# Patient Record
Sex: FEMALE | Race: WHITE | Employment: FULL TIME | URBAN - METROPOLITAN AREA
[De-identification: names, ages, dates, MRNs, and addresses within clinical notes are randomized per-mention and may not be internally consistent; named-entity substitution may affect disease eponyms.]

---

## 2018-12-12 ENCOUNTER — OFFICE VISIT (OUTPATIENT)
Dept: INTERNAL MEDICINE CLINIC | Age: 53
End: 2018-12-12
Payer: COMMERCIAL

## 2018-12-12 VITALS
DIASTOLIC BLOOD PRESSURE: 72 MMHG | BODY MASS INDEX: 45.99 KG/M2 | WEIGHT: 293 LBS | SYSTOLIC BLOOD PRESSURE: 126 MMHG | HEIGHT: 67 IN | OXYGEN SATURATION: 96 % | HEART RATE: 69 BPM

## 2018-12-12 DIAGNOSIS — Z00.00 HEALTHCARE MAINTENANCE: ICD-10-CM

## 2018-12-12 DIAGNOSIS — J30.1 ALLERGIC RHINITIS DUE TO POLLEN, UNSPECIFIED SEASONALITY: Primary | ICD-10-CM

## 2018-12-12 DIAGNOSIS — F90.9 ATTENTION DEFICIT HYPERACTIVITY DISORDER (ADHD), UNSPECIFIED ADHD TYPE: ICD-10-CM

## 2018-12-12 DIAGNOSIS — F41.1 GAD (GENERALIZED ANXIETY DISORDER): ICD-10-CM

## 2018-12-12 DIAGNOSIS — I10 ESSENTIAL HYPERTENSION: ICD-10-CM

## 2018-12-12 PROCEDURE — 99203 OFFICE O/P NEW LOW 30 MIN: CPT | Performed by: NURSE PRACTITIONER

## 2018-12-12 RX ORDER — LISINOPRIL AND HYDROCHLOROTHIAZIDE 12.5; 1 MG/1; MG/1
1 TABLET ORAL DAILY
COMMUNITY
End: 2018-12-14 | Stop reason: SDUPTHER

## 2018-12-12 RX ORDER — FEXOFENADINE HCL 180 MG/1
TABLET ORAL
Qty: 30 TABLET | Refills: 3 | Status: SHIPPED | OUTPATIENT
Start: 2018-12-12 | End: 2019-03-14 | Stop reason: SDUPTHER

## 2018-12-12 ASSESSMENT — PATIENT HEALTH QUESTIONNAIRE - PHQ9
2. FEELING DOWN, DEPRESSED OR HOPELESS: 0
SUM OF ALL RESPONSES TO PHQ9 QUESTIONS 1 & 2: 0
SUM OF ALL RESPONSES TO PHQ QUESTIONS 1-9: 0
SUM OF ALL RESPONSES TO PHQ QUESTIONS 1-9: 0
1. LITTLE INTEREST OR PLEASURE IN DOING THINGS: 0

## 2018-12-12 ASSESSMENT — ENCOUNTER SYMPTOMS
CHEST TIGHTNESS: 0
SHORTNESS OF BREATH: 0
DIARRHEA: 0
CONSTIPATION: 0

## 2018-12-12 NOTE — PROGRESS NOTES
2018    Alida Major (:  1965) magnus 47 y.o. female, here for evaluation of the following medical concerns: This patient is being seen today for hypertension, ADHD, and depression. She would like to establish care and receive preventative care services. Hypertension: Patient here for follow-up of elevated blood pressure. She is not exercising and is not adherent to low salt diet. Blood pressure is well controlled at home. Cardiac symptoms none. Patient denies chest pain, dyspnea, fatigue and irregular heart beat. Cardiovascular risk factors: obesity (BMI >= 30 kg/m2) and sedentary lifestyle. Use of agents associated with hypertension: none. History of target organ damage: none. Depression  Patient complains of depression. She complains of depressed mood. Onset was approximately several years ago. Symptoms have been stable since that time. Current symptoms include: depressed mood. Patient denies fatigue, feelings of worthlessness/guilt, hopelessness, hypersomnia, insomnia, suicidal attempt and suicidal thoughts with specific plan. Family history significant for no psychiatric illness. Possible organic causes contributing are: none. Risk factors: none. Currently taking Celexa with significant results. ADHD: Patient complains of ADHD  She has the following anxiety symptoms: difficulty concentrating. Onset of symptoms was  several years ago, gradually worsening since that time. She denies current suicidal and homicidal ideation. Possible organic causes contributing are: none. Risk factors: none Previous treatment includes Adderall. She has not taken medication for several months. Review of Systems   Constitutional: Negative for activity change. HENT: Positive for congestion. Negative for postnasal drip. Eyes: Negative for visual disturbance. Respiratory: Negative for chest tightness and shortness of breath. Cardiovascular: Negative for chest pain and palpitations. file.     Social History Main Topics    Smoking status: Former Smoker     Types: Cigarettes     Start date: 1/1/1982     Quit date: 1/1/1998    Smokeless tobacco: Never Used    Alcohol use Yes      Comment: occ wine    Drug use: No    Sexual activity: Not on file     Other Topics Concern    Not on file     Social History Narrative    Lives alone        Family History   Problem Relation Age of Onset    Hypertension Mother     Cancer Mother         breast    Dementia Mother     Stroke Mother     Hypertension Father     Diabetes Father     Dementia Father     No Known Problems Maternal Grandmother     No Known Problems Maternal Grandfather     No Known Problems Paternal Grandmother     No Known Problems Paternal Grandfather        Vitals:    12/12/18 1251   BP: 126/72   Pulse: 69   SpO2: 96%   Weight: (!) 319 lb 9.6 oz (145 kg)   Height: 5' 7\" (1.702 m)     Estimated body mass index is 50.06 kg/m² as calculated from the following:    Height as of this encounter: 5' 7\" (1.702 m). Weight as of this encounter: 319 lb 9.6 oz (145 kg). Physical Exam   Constitutional: She is oriented to person, place, and time. Vital signs are normal. She appears well-developed and well-nourished. She is cooperative. HENT:   Head: Normocephalic. Right Ear: Hearing and external ear normal.   Left Ear: Hearing and external ear normal.   Nose: Nose normal.   Eyes: Lids are normal.   Cardiovascular: Normal rate, regular rhythm, S1 normal, S2 normal, normal heart sounds and intact distal pulses. Pulmonary/Chest: Effort normal and breath sounds normal.   Neurological: She is alert and oriented to person, place, and time. GCS eye subscore is 4. GCS verbal subscore is 5. GCS motor subscore is 6. Skin: Skin is warm and dry. Vitals reviewed. ASSESSMENT/PLAN:  1.  Allergic rhinitis due to pollen, unspecified seasonality  -reports intermittent symptoms, requesting a refill  - fexofenadine (ALLEGRA) 180 MG tablet;

## 2018-12-13 ASSESSMENT — ENCOUNTER SYMPTOMS: BACK PAIN: 0

## 2018-12-14 DIAGNOSIS — I10 ESSENTIAL HYPERTENSION: ICD-10-CM

## 2018-12-14 RX ORDER — ATENOLOL 50 MG/1
50 TABLET ORAL DAILY
Qty: 30 TABLET | Refills: 3 | Status: SHIPPED | OUTPATIENT
Start: 2018-12-14 | End: 2019-03-14

## 2018-12-14 RX ORDER — LISINOPRIL AND HYDROCHLOROTHIAZIDE 12.5; 1 MG/1; MG/1
1 TABLET ORAL DAILY
Qty: 30 TABLET | Refills: 3 | Status: SHIPPED | OUTPATIENT
Start: 2018-12-14 | End: 2019-03-14 | Stop reason: SDUPTHER

## 2018-12-14 RX ORDER — CITALOPRAM 40 MG/1
TABLET ORAL
Qty: 30 TABLET | Refills: 3 | Status: SHIPPED | OUTPATIENT
Start: 2018-12-14 | End: 2019-03-14 | Stop reason: SDUPTHER

## 2018-12-14 RX ORDER — LEVOTHYROXINE SODIUM 0.03 MG/1
TABLET ORAL
Qty: 30 TABLET | Refills: 3 | Status: SHIPPED | OUTPATIENT
Start: 2018-12-14 | End: 2019-03-14 | Stop reason: SDUPTHER

## 2018-12-20 ENCOUNTER — TELEPHONE (OUTPATIENT)
Dept: PAIN MANAGEMENT | Age: 53
End: 2018-12-20

## 2018-12-20 NOTE — TELEPHONE ENCOUNTER
Submitted PA for Vyvanse 30MG OR CAPS Key: [id number] - PA Case ID: [id number] - Rx #: [id number] , via CM. Medication is APPROVED until 12/20/2019. Please advise patient. Thank you.

## 2019-03-14 ENCOUNTER — OFFICE VISIT (OUTPATIENT)
Dept: INTERNAL MEDICINE CLINIC | Age: 54
End: 2019-03-14
Payer: COMMERCIAL

## 2019-03-14 ENCOUNTER — HOSPITAL ENCOUNTER (OUTPATIENT)
Dept: MAMMOGRAPHY | Age: 54
Discharge: HOME | End: 2019-03-19
Payer: COMMERCIAL

## 2019-03-14 VITALS
HEIGHT: 67 IN | DIASTOLIC BLOOD PRESSURE: 81 MMHG | SYSTOLIC BLOOD PRESSURE: 127 MMHG | HEART RATE: 69 BPM | OXYGEN SATURATION: 96 % | WEIGHT: 293 LBS | BODY MASS INDEX: 45.99 KG/M2

## 2019-03-14 DIAGNOSIS — J30.1 ALLERGIC RHINITIS DUE TO POLLEN, UNSPECIFIED SEASONALITY: ICD-10-CM

## 2019-03-14 DIAGNOSIS — I10 ESSENTIAL HYPERTENSION: Primary | ICD-10-CM

## 2019-03-14 DIAGNOSIS — Z12.31 VISIT FOR SCREENING MAMMOGRAM: ICD-10-CM

## 2019-03-14 DIAGNOSIS — F41.1 GAD (GENERALIZED ANXIETY DISORDER): ICD-10-CM

## 2019-03-14 DIAGNOSIS — F90.9 ATTENTION DEFICIT HYPERACTIVITY DISORDER (ADHD), UNSPECIFIED ADHD TYPE: ICD-10-CM

## 2019-03-14 DIAGNOSIS — Z00.00 HEALTHCARE MAINTENANCE: ICD-10-CM

## 2019-03-14 PROCEDURE — 77063 BREAST TOMOSYNTHESIS BI: CPT

## 2019-03-14 PROCEDURE — 99214 OFFICE O/P EST MOD 30 MIN: CPT | Performed by: NURSE PRACTITIONER

## 2019-03-14 RX ORDER — LISINOPRIL AND HYDROCHLOROTHIAZIDE 12.5; 1 MG/1; MG/1
1 TABLET ORAL DAILY
Qty: 30 TABLET | Refills: 5 | Status: SHIPPED | OUTPATIENT
Start: 2019-03-14 | End: 2019-04-15 | Stop reason: SDUPTHER

## 2019-03-14 RX ORDER — CITALOPRAM 40 MG/1
TABLET ORAL
Qty: 30 TABLET | Refills: 5 | Status: SHIPPED | OUTPATIENT
Start: 2019-03-14 | End: 2019-04-15 | Stop reason: SDUPTHER

## 2019-03-14 RX ORDER — LEVOTHYROXINE SODIUM 0.03 MG/1
TABLET ORAL
Qty: 30 TABLET | Refills: 5 | Status: SHIPPED | OUTPATIENT
Start: 2019-03-14 | End: 2019-04-15 | Stop reason: SDUPTHER

## 2019-03-14 RX ORDER — FEXOFENADINE HCL 180 MG/1
TABLET ORAL
Qty: 30 TABLET | Refills: 5 | Status: SHIPPED | OUTPATIENT
Start: 2019-03-14 | End: 2021-03-29

## 2019-03-14 RX ORDER — ATENOLOL 50 MG/1
50 TABLET ORAL DAILY
Qty: 30 TABLET | Refills: 5 | Status: SHIPPED | OUTPATIENT
Start: 2019-03-14 | End: 2019-04-15 | Stop reason: SDUPTHER

## 2019-03-14 RX ORDER — ATENOLOL 50 MG/1
50 TABLET ORAL DAILY
COMMUNITY
End: 2019-03-14 | Stop reason: SDUPTHER

## 2019-03-14 ASSESSMENT — ENCOUNTER SYMPTOMS
SHORTNESS OF BREATH: 0
DIARRHEA: 0
CONSTIPATION: 0
CHEST TIGHTNESS: 0
ABDOMINAL PAIN: 0

## 2019-03-14 ASSESSMENT — PATIENT HEALTH QUESTIONNAIRE - PHQ9
SUM OF ALL RESPONSES TO PHQ QUESTIONS 1-9: 0
SUM OF ALL RESPONSES TO PHQ QUESTIONS 1-9: 0
SUM OF ALL RESPONSES TO PHQ9 QUESTIONS 1 & 2: 0
2. FEELING DOWN, DEPRESSED OR HOPELESS: 0
1. LITTLE INTEREST OR PLEASURE IN DOING THINGS: 0

## 2019-03-18 NOTE — PROGRESS NOTES
Patient not reached. Preop instructions left on voice mail. Ltzneq__[phone]_______      DATE_4/3/19_______ TIME__0800_____ARRIVAL__FEC  0630_______      Nothing to eat or drink after midnight the night before,except for what the prep instructions call for. If you do not have the instructions or do not understand them please contact your doctors office. Follow any instructions your doctors office has given you including what medications to take the AM of your procedure and which ones to hold. You may use your inhalers. If you take a long acting insulin the juvencio prior please cut the dose in half and take no diabetic medications that AM.Follow specific doctors office instructions regarding blood thinners and if they want you to hold and for how long. If you are on a blood thinner and have no instructions please contact the office and ask. Dress comfortably,bring your insurance card,picture ID,and a complete list of medications, including supplements. You must have a responsible adult to stay with you during the procedure,drive you home and stay with you. [facility] phone number [phone] for any questions.

## 2019-03-19 ENCOUNTER — ANESTHESIA EVENT (OUTPATIENT)
Dept: ENDOSCOPY | Age: 54
End: 2019-03-19
Payer: COMMERCIAL

## 2019-04-03 ENCOUNTER — ANESTHESIA (OUTPATIENT)
Dept: ENDOSCOPY | Age: 54
End: 2019-04-03
Payer: COMMERCIAL

## 2019-04-14 DIAGNOSIS — I10 ESSENTIAL HYPERTENSION: ICD-10-CM

## 2019-04-15 RX ORDER — CITALOPRAM 40 MG/1
TABLET ORAL
Qty: 30 TABLET | Refills: 0 | Status: SHIPPED | OUTPATIENT
Start: 2019-04-15 | End: 2019-10-08 | Stop reason: SDUPTHER

## 2019-04-15 RX ORDER — ATENOLOL 50 MG/1
50 TABLET ORAL DAILY
Qty: 30 TABLET | Refills: 0 | Status: SHIPPED | OUTPATIENT
Start: 2019-04-15 | End: 2019-10-08 | Stop reason: SDUPTHER

## 2019-04-15 RX ORDER — LEVOTHYROXINE SODIUM 0.03 MG/1
TABLET ORAL
Qty: 30 TABLET | Refills: 0 | Status: SHIPPED | OUTPATIENT
Start: 2019-04-15 | End: 2019-10-08 | Stop reason: SDUPTHER

## 2019-04-15 RX ORDER — LISINOPRIL AND HYDROCHLOROTHIAZIDE 12.5; 1 MG/1; MG/1
1 TABLET ORAL DAILY
Qty: 30 TABLET | Refills: 0 | Status: SHIPPED | OUTPATIENT
Start: 2019-04-15 | End: 2019-10-08 | Stop reason: SDUPTHER

## 2019-04-16 ENCOUNTER — HOSPITAL ENCOUNTER (OUTPATIENT)
Age: 54
Setting detail: OUTPATIENT SURGERY
Discharge: HOME | End: 2019-04-16
Attending: INTERNAL MEDICINE | Admitting: INTERNAL MEDICINE
Payer: COMMERCIAL

## 2019-04-16 VITALS
WEIGHT: 280 LBS | HEIGHT: 68 IN | DIASTOLIC BLOOD PRESSURE: 71 MMHG | SYSTOLIC BLOOD PRESSURE: 129 MMHG | RESPIRATION RATE: 16 BRPM | OXYGEN SATURATION: 100 % | BODY MASS INDEX: 42.44 KG/M2 | TEMPERATURE: 98.1 F | HEART RATE: 71 BPM

## 2019-04-16 VITALS
OXYGEN SATURATION: 93 % | RESPIRATION RATE: 19 BRPM | DIASTOLIC BLOOD PRESSURE: 79 MMHG | SYSTOLIC BLOOD PRESSURE: 116 MMHG

## 2019-04-16 PROCEDURE — 3700000000 HC ANESTHESIA ATTENDED CARE: Performed by: INTERNAL MEDICINE

## 2019-04-16 PROCEDURE — 2500000003 HC RX 250 WO HCPCS: Performed by: NURSE ANESTHETIST, CERTIFIED REGISTERED

## 2019-04-16 PROCEDURE — 7100000011 HC PHASE II RECOVERY - ADDTL 15 MIN: Performed by: INTERNAL MEDICINE

## 2019-04-16 PROCEDURE — 6360000002 HC RX W HCPCS: Performed by: NURSE ANESTHETIST, CERTIFIED REGISTERED

## 2019-04-16 PROCEDURE — 3609010600 HC COLONOSCOPY POLYPECTOMY SNARE/COLD BIOPSY: Performed by: INTERNAL MEDICINE

## 2019-04-16 PROCEDURE — 7100000010 HC PHASE II RECOVERY - FIRST 15 MIN: Performed by: INTERNAL MEDICINE

## 2019-04-16 PROCEDURE — 3700000001 HC ADD 15 MINUTES (ANESTHESIA): Performed by: INTERNAL MEDICINE

## 2019-04-16 PROCEDURE — 2709999900 HC NON-CHARGEABLE SUPPLY: Performed by: INTERNAL MEDICINE

## 2019-04-16 PROCEDURE — 2580000003 HC RX 258: Performed by: ANESTHESIOLOGY

## 2019-04-16 RX ORDER — SODIUM CHLORIDE 9 MG/ML
INJECTION, SOLUTION INTRAVENOUS CONTINUOUS
Status: DISCONTINUED | OUTPATIENT
Start: 2019-04-16 | End: 2019-04-16 | Stop reason: HOSPADM

## 2019-04-16 RX ORDER — LIDOCAINE HYDROCHLORIDE 20 MG/ML
INJECTION, SOLUTION INFILTRATION; PERINEURAL PRN
Status: DISCONTINUED | OUTPATIENT
Start: 2019-04-16 | End: 2019-04-16 | Stop reason: SDUPTHER

## 2019-04-16 RX ORDER — PROPOFOL 10 MG/ML
INJECTION, EMULSION INTRAVENOUS PRN
Status: DISCONTINUED | OUTPATIENT
Start: 2019-04-16 | End: 2019-04-16 | Stop reason: SDUPTHER

## 2019-04-16 RX ADMIN — PROPOFOL 40 MG: 10 INJECTION, EMULSION INTRAVENOUS at 08:00

## 2019-04-16 RX ADMIN — PROPOFOL 100 MG: 10 INJECTION, EMULSION INTRAVENOUS at 08:06

## 2019-04-16 RX ADMIN — PROPOFOL 40 MG: 10 INJECTION, EMULSION INTRAVENOUS at 08:03

## 2019-04-16 RX ADMIN — PROPOFOL 30 MG: 10 INJECTION, EMULSION INTRAVENOUS at 07:56

## 2019-04-16 RX ADMIN — PROPOFOL 200 MG: 10 INJECTION, EMULSION INTRAVENOUS at 07:50

## 2019-04-16 RX ADMIN — PROPOFOL 30 MG: 10 INJECTION, EMULSION INTRAVENOUS at 07:52

## 2019-04-16 RX ADMIN — LIDOCAINE HYDROCHLORIDE 100 MG: 20 INJECTION, SOLUTION INFILTRATION; PERINEURAL at 07:50

## 2019-04-16 RX ADMIN — PROPOFOL 10 MG: 10 INJECTION, EMULSION INTRAVENOUS at 08:05

## 2019-04-16 RX ADMIN — SODIUM CHLORIDE: 9 INJECTION, SOLUTION INTRAVENOUS at 07:15

## 2019-04-16 RX ADMIN — PROPOFOL 30 MG: 10 INJECTION, EMULSION INTRAVENOUS at 07:58

## 2019-04-16 RX ADMIN — PROPOFOL 20 MG: 10 INJECTION, EMULSION INTRAVENOUS at 07:54

## 2019-04-16 ASSESSMENT — PAIN - FUNCTIONAL ASSESSMENT: PAIN_FUNCTIONAL_ASSESSMENT: 0-10

## 2019-04-16 ASSESSMENT — PAIN SCALES - GENERAL: PAINLEVEL_OUTOF10: 0

## 2019-04-16 NOTE — PROGRESS NOTES
Name:  Konrad Mcintosh  Age:  47 y.o.  CSN:  [id number]            Past Medical History:        Diagnosis Date    ADHD (attention deficit hyperactivity disorder)     FINESSE (generalized anxiety disorder)     Hypertension     Hypothyroidism     Obesity        Past Surgical History:      Procedure Laterality Date    CARPAL TUNNEL RELEASE Right     25 years ago    CHOLECYSTECTOMY      LUMBAR LAMINECTOMY  2015    L4-L5 with cyst removal    SINUS SURGERY         Medications Prior to Admission:  Medications Prior to Admission: atenolol (TENORMIN) 50 MG tablet, TAKE 1 TABLET BY MOUTH DAILY  lisinopril-hydrochlorothiazide (PRINZIDE;ZESTORETIC) 10-12.5 MG per tablet, TAKE 1 TABLET BY MOUTH DAILY  levothyroxine (SYNTHROID) 25 MCG tablet, TAKE 1 TABLET BY MOUTH EVERY DAY  citalopram (CELEXA) 40 MG tablet, TAKE 1 TABLET BY MOUTH DAILY  lisdexamfetamine (VYVANSE) 30 MG capsule, Take 1 capsule by mouth every morning for 30 days. [place] [place] lisdexamfetamine (VYVANSE) 30 MG capsule, Take 30 mg by mouth every morning. lisdexamfetamine (VYVANSE) 30 MG capsule, Take 1 capsule by mouth every morning for 30 days. [START ON 5/13/2019] lisdexamfetamine (VYVANSE) 30 MG capsule, Take 1 capsule by mouth every morning for 30 days.   fexofenadine (ALLEGRA) 180 MG tablet, TAKE 1 TABLET BY MOUTH EVERY DAY    Allergies:  Latex    Social History:  Social History     Socioeconomic History    Marital status: Single     Spouse name: Not on file    Number of children: Not on file    Years of education: Not on file    Highest education level: Not on file   Occupational History    Not on file   Social Needs    Financial resource strain: Not on file    Food insecurity:     Worry: Not on file     Inability: Not on file    Transportation needs:     Medical: Not on file     Non-medical: Not on file   Tobacco Use    Smoking status: Former Smoker     Types: Cigarettes     Start date: 1/1/1982     Last attempt to quit: 1/1/1998     Years since quitting: 21.3    Smokeless tobacco: Never Used   Substance and Sexual Activity    Alcohol use: Yes     Comment: occ wine    Drug use: No    Sexual activity: Not on file   Lifestyle    Physical activity:     Days per week: Not on file     Minutes per session: Not on file    Stress: Not on file   Relationships    Social connections:     Talks on phone: Not on file     Gets together: Not on file     Attends Mu-ism service: Not on file     Active member of club or organization: Not on file     Attends meetings of clubs or organizations: Not on file     Relationship status: Not on file    Intimate partner violence:     Fear of current or ex partner: Not on file     Emotionally abused: Not on file     Physically abused: Not on file     Forced sexual activity: Not on file   Other Topics Concern    Not on file   Social History Narrative    Lives alone       Family History:      Problem Relation Age of Onset    Hypertension Mother     Cancer Mother         breast    Dementia Mother     Stroke Mother     Hypertension Father     Diabetes Father     Dementia Father     No Known Problems Maternal Grandmother     No Known Problems Maternal Grandfather     No Known Problems Paternal Grandmother     No Known Problems Paternal Grandfather        Vital Signs:  Vitals:    04/16/19 0656   BP: (!) 155/79   Pulse: 80   Resp: 16   Temp: 97.8 °F (36.6 °C)   SpO2: 98%

## 2019-04-16 NOTE — ANESTHESIA PRE PROCEDURE
ADHD (attention deficit hyperactivity disorder)     FINESSE (generalized anxiety disorder)     Hypertension     Hypothyroidism     Obesity        Past Surgical History:        Procedure Laterality Date    CARPAL TUNNEL RELEASE Right     25 years ago    CHOLECYSTECTOMY      LUMBAR LAMINECTOMY  2015    L4-L5 with cyst removal    SINUS SURGERY         Social History:    Social History     Tobacco Use    Smoking status: Former Smoker     Types: Cigarettes     Start date: 1982     Last attempt to quit: 1998     Years since quittin.3    Smokeless tobacco: Never Used   Substance Use Topics    Alcohol use: Yes     Comment: occ wine                                Counseling given: Not Answered      Vital Signs (Current): There were no vitals filed for this visit. BP Readings from Last 3 Encounters:   19 127/81   18 126/72   07/15/13 118/71       NPO Status:                                                                                 BMI:   Wt Readings from Last 3 Encounters:   19 (!) 319 lb 12.8 oz (145.1 kg)   18 (!) 319 lb 9.6 oz (145 kg)     There is no height or weight on file to calculate BMI.    CBC: No results found for: WBC, RBC, HGB, HCT, MCV, RDW, PLT    CMP: No results found for: NA, K, CL, CO2, BUN, CREATININE, GFRAA, AGRATIO, LABGLOM, GLUCOSE, PROT, CALCIUM, BILITOT, ALKPHOS, AST, ALT    POC Tests: No results for input(s): POCGLU, POCNA, POCK, POCCL, POCBUN, POCHEMO, POCHCT in the last 72 hours.     Coags: No results found for: PROTIME, INR, APTT    HCG (If Applicable): No results found for: PREGTESTUR, PREGSERUM, HCG, HCGQUANT     ABGs: No results found for: PHART, PO2ART, VYU8TMC, WKA2QFR, BEART, T9MFDPPF     Type & Screen (If Applicable):  No results found for: LABABO, LABRH    Anesthesia Evaluation    Airway: Mallampati: II  TM distance: >3 FB   Neck ROM: full  Mouth opening: > = 3 FB Dental:          Pulmonary: Cardiovascular:    (+) hypertension:,         Rhythm: regular  Rate: normal                    Neuro/Psych:   (+) psychiatric history:            GI/Hepatic/Renal:             Endo/Other:    (+) hypothyroidism::., .                 Abdominal:           Vascular:                                        Anesthesia Plan      MAC     ASA 2       Induction: intravenous. Anesthetic plan and risks discussed with patient. Plan discussed with CRNA.                   Blaze Martinez MD   4/16/2019

## 2019-10-08 DIAGNOSIS — F90.9 ATTENTION DEFICIT HYPERACTIVITY DISORDER (ADHD), UNSPECIFIED ADHD TYPE: Primary | ICD-10-CM

## 2019-10-08 DIAGNOSIS — I10 ESSENTIAL HYPERTENSION: ICD-10-CM

## 2019-10-08 RX ORDER — LEVOTHYROXINE SODIUM 0.03 MG/1
TABLET ORAL
Qty: 30 TABLET | Refills: 1 | Status: SHIPPED | OUTPATIENT
Start: 2019-10-08 | End: 2019-10-09 | Stop reason: SDUPTHER

## 2019-10-08 RX ORDER — LISINOPRIL AND HYDROCHLOROTHIAZIDE 12.5; 1 MG/1; MG/1
1 TABLET ORAL DAILY
Qty: 30 TABLET | Refills: 1 | Status: SHIPPED | OUTPATIENT
Start: 2019-10-08 | End: 2019-10-09 | Stop reason: SDUPTHER

## 2019-10-08 RX ORDER — ATENOLOL 50 MG/1
50 TABLET ORAL DAILY
Qty: 30 TABLET | Refills: 1 | Status: SHIPPED | OUTPATIENT
Start: 2019-10-08 | End: 2019-12-07 | Stop reason: SDUPTHER

## 2019-10-08 RX ORDER — CITALOPRAM 40 MG/1
TABLET ORAL
Qty: 30 TABLET | Refills: 1 | Status: SHIPPED | OUTPATIENT
Start: 2019-10-08 | End: 2019-10-09 | Stop reason: SDUPTHER

## 2019-10-09 DIAGNOSIS — I10 ESSENTIAL HYPERTENSION: ICD-10-CM

## 2019-10-09 DIAGNOSIS — F90.9 ATTENTION DEFICIT HYPERACTIVITY DISORDER (ADHD), UNSPECIFIED ADHD TYPE: ICD-10-CM

## 2019-10-09 RX ORDER — LEVOTHYROXINE SODIUM 0.03 MG/1
TABLET ORAL
Qty: 30 TABLET | Refills: 3 | Status: SHIPPED | OUTPATIENT
Start: 2019-10-09 | End: 2020-02-04

## 2019-10-09 RX ORDER — CITALOPRAM 40 MG/1
TABLET ORAL
Qty: 30 TABLET | Refills: 3 | Status: SHIPPED | OUTPATIENT
Start: 2019-10-09 | End: 2020-02-04

## 2019-10-09 RX ORDER — LISINOPRIL AND HYDROCHLOROTHIAZIDE 12.5; 1 MG/1; MG/1
1 TABLET ORAL DAILY
Qty: 30 TABLET | Refills: 3 | Status: SHIPPED | OUTPATIENT
Start: 2019-10-09 | End: 2020-02-04

## 2019-10-22 ENCOUNTER — OFFICE VISIT (OUTPATIENT)
Dept: INTERNAL MEDICINE CLINIC | Age: 54
End: 2019-10-22
Payer: COMMERCIAL

## 2019-10-22 VITALS
HEART RATE: 67 BPM | SYSTOLIC BLOOD PRESSURE: 132 MMHG | DIASTOLIC BLOOD PRESSURE: 78 MMHG | BODY MASS INDEX: 47.09 KG/M2 | WEIGHT: 293 LBS | OXYGEN SATURATION: 94 % | HEIGHT: 66 IN

## 2019-10-22 DIAGNOSIS — F90.9 ATTENTION DEFICIT HYPERACTIVITY DISORDER (ADHD), UNSPECIFIED ADHD TYPE: Primary | ICD-10-CM

## 2019-10-22 DIAGNOSIS — E66.01 CLASS 3 SEVERE OBESITY WITHOUT SERIOUS COMORBIDITY WITH BODY MASS INDEX (BMI) OF 50.0 TO 59.9 IN ADULT, UNSPECIFIED OBESITY TYPE (HCC): ICD-10-CM

## 2019-10-22 DIAGNOSIS — R06.2 WHEEZING: ICD-10-CM

## 2019-10-22 DIAGNOSIS — I10 ESSENTIAL HYPERTENSION: ICD-10-CM

## 2019-10-22 PROCEDURE — 99214 OFFICE O/P EST MOD 30 MIN: CPT | Performed by: NURSE PRACTITIONER

## 2019-10-22 RX ORDER — ALBUTEROL SULFATE 90 UG/1
2 AEROSOL, METERED RESPIRATORY (INHALATION) EVERY 6 HOURS PRN
Qty: 1 INHALER | Refills: 3 | Status: SHIPPED | OUTPATIENT
Start: 2019-10-22 | End: 2021-03-01 | Stop reason: SDUPTHER

## 2019-10-24 ASSESSMENT — ENCOUNTER SYMPTOMS
CONSTIPATION: 0
SHORTNESS OF BREATH: 0
DIARRHEA: 0
CHEST TIGHTNESS: 0
ABDOMINAL PAIN: 0

## 2020-06-04 RX ORDER — LEVOTHYROXINE SODIUM 0.03 MG/1
TABLET ORAL
Qty: 90 TABLET | Refills: 1 | Status: SHIPPED | OUTPATIENT
Start: 2020-06-04 | End: 2020-12-09 | Stop reason: SDUPTHER

## 2020-06-04 RX ORDER — LISINOPRIL AND HYDROCHLOROTHIAZIDE 12.5; 1 MG/1; MG/1
1 TABLET ORAL DAILY
Qty: 90 TABLET | Refills: 1 | Status: SHIPPED | OUTPATIENT
Start: 2020-06-04 | End: 2020-12-09 | Stop reason: SDUPTHER

## 2020-06-04 RX ORDER — CITALOPRAM 40 MG/1
TABLET ORAL
Qty: 90 TABLET | Refills: 1 | Status: SHIPPED | OUTPATIENT
Start: 2020-06-04 | End: 2020-12-08 | Stop reason: SDUPTHER

## 2020-10-27 RX ORDER — ATENOLOL 50 MG/1
50 TABLET ORAL DAILY
Qty: 90 TABLET | Refills: 3 | Status: SHIPPED | OUTPATIENT
Start: 2020-10-27 | End: 2020-12-09 | Stop reason: SDUPTHER

## 2020-12-08 RX ORDER — CITALOPRAM 40 MG/1
TABLET ORAL
Qty: 90 TABLET | Refills: 1 | Status: SHIPPED | OUTPATIENT
Start: 2020-12-08 | End: 2021-06-02

## 2020-12-09 ENCOUNTER — VIRTUAL VISIT (OUTPATIENT)
Dept: PRIMARY CARE CLINIC | Age: 55
End: 2020-12-09
Payer: COMMERCIAL

## 2020-12-09 PROCEDURE — 99214 OFFICE O/P EST MOD 30 MIN: CPT | Performed by: NURSE PRACTITIONER

## 2020-12-09 RX ORDER — DEXTROAMPHETAMINE SACCHARATE, AMPHETAMINE ASPARTATE, DEXTROAMPHETAMINE SULFATE AND AMPHETAMINE SULFATE 5; 5; 5; 5 MG/1; MG/1; MG/1; MG/1
20 TABLET ORAL DAILY
Qty: 30 TABLET | Refills: 0 | Status: SHIPPED | OUTPATIENT
Start: 2020-12-09 | End: 2021-03-29 | Stop reason: SDUPTHER

## 2020-12-09 RX ORDER — DEXTROAMPHETAMINE SACCHARATE, AMPHETAMINE ASPARTATE, DEXTROAMPHETAMINE SULFATE AND AMPHETAMINE SULFATE 5; 5; 5; 5 MG/1; MG/1; MG/1; MG/1
20 TABLET ORAL DAILY
Qty: 30 TABLET | Refills: 0 | Status: SHIPPED | OUTPATIENT
Start: 2021-02-09 | End: 2021-03-29 | Stop reason: SDUPTHER

## 2020-12-09 RX ORDER — LEVOTHYROXINE SODIUM 0.03 MG/1
TABLET ORAL
Qty: 90 TABLET | Refills: 1 | Status: SHIPPED | OUTPATIENT
Start: 2020-12-09 | End: 2021-06-02

## 2020-12-09 RX ORDER — LISINOPRIL AND HYDROCHLOROTHIAZIDE 12.5; 1 MG/1; MG/1
1 TABLET ORAL DAILY
Qty: 90 TABLET | Refills: 1 | Status: SHIPPED | OUTPATIENT
Start: 2020-12-09 | End: 2021-06-02

## 2020-12-09 RX ORDER — DEXTROAMPHETAMINE SACCHARATE, AMPHETAMINE ASPARTATE, DEXTROAMPHETAMINE SULFATE AND AMPHETAMINE SULFATE 5; 5; 5; 5 MG/1; MG/1; MG/1; MG/1
20 TABLET ORAL DAILY
Qty: 30 TABLET | Refills: 0 | Status: SHIPPED | OUTPATIENT
Start: 2021-01-09 | End: 2021-03-29 | Stop reason: SDUPTHER

## 2020-12-09 RX ORDER — ATENOLOL 50 MG/1
50 TABLET ORAL DAILY
Qty: 90 TABLET | Refills: 1 | Status: SHIPPED | OUTPATIENT
Start: 2020-12-09 | End: 2021-09-02

## 2020-12-09 ASSESSMENT — ENCOUNTER SYMPTOMS
DIARRHEA: 0
ABDOMINAL PAIN: 0
CONSTIPATION: 0
SHORTNESS OF BREATH: 0
CHEST TIGHTNESS: 0

## 2020-12-09 ASSESSMENT — VISUAL ACUITY: OU: 1

## 2020-12-09 NOTE — PROGRESS NOTES
[address] [facility]  [address] [address]  Dept: [phone]       2020   Restrictions on close contact interactions as recommended by CDC and health authorities, the patient's visit was conducted via telemedicine in lieu of a face to face visit. Patient verbally consented and agreed to proceed    Nelida Rebollar (:  1965)is a 48 y.o. female, here for evaluation of hypertension, hypothyroidism, obesity, ADHD. She is without complaints. HPI   ADD/ADHD:  Current treatment: Vyvanse- 30 mg. States she has not been able to afford medication ($90/month), [facility] States she has not taken medication for months. She is working from home and reports difficulty with concentration.      Hypertension:  Home blood pressure monitoring: No.  She is not adherent to a low sodium diet. Patient denies chest pain, shortness of breath, blurred vision, palpitations and fatigue. Antihypertensive medication side effects: no medication side effects noted. Use of agents associated with hypertension: none. Obesity  She reports unhealthy eating habits. She reports decreasing Soda. States she has a desire to lose weight. She reports occasionally doing chair pilate's and walking her dog every morning. Does not believe that she has lost much weight. Discussed making a plan now to what foods to decrease and developing an exercise regimen. Hypothyroidism: Recent symptoms: fatigue, weight gain and dry skin. She denies fatigue and weight gain. Patient is  taking her medication consistently on an empty stomach. Depression/Anxiety  She reports taking medication as prescribed, with effectiveness. Denies homicidal/suicidal ideations. Reports walking every morning helps with isolation due to pandemic.        Health Care Maintenance:  Influenza vaccine: out dated, will obtain from local pharmacy  Cervical Cancer screen: Normal PAP 2 years ago per patient report  Breast cancer Screen: due 3/2021    Review of Systems   Constitutional: Negative for activity change and fever. HENT: Negative for congestion. Eyes: Negative for visual disturbance. Respiratory: Negative for chest tightness and shortness of breath. Cardiovascular: Negative for chest pain, palpitations and leg swelling. Gastrointestinal: Negative for abdominal pain, constipation and diarrhea. Endocrine: Negative for polyuria. Genitourinary: Negative for dysuria. Musculoskeletal: Negative for arthralgias and myalgias. Skin: Negative for rash. Neurological: Negative for dizziness, light-headedness and headaches. Psychiatric/Behavioral: Negative for agitation, decreased concentration and sleep disturbance. The patient is not nervous/anxious. Prior to Visit Medications    Medication Sig Taking?  Authorizing Provider   citalopram (CELEXA) 40 MG tablet 1 daily  NICOLE Lopez CNP   atenolol (TENORMIN) 50 MG tablet Take 1 tablet by mouth daily  NICOLE Lopez CNP   levothyroxine (SYNTHROID) 25 MCG tablet TAKE 1 TABLET BY MOUTH DAILY ON AN EMPTY STOMACH  NICOLE Lopez CNP   lisinopril-hydroCHLOROthiazide (PRINZIDE;ZESTORETIC) 10-12.5 MG per tablet TAKE 1 TABLET BY MOUTH DAILY  NICOLE Lopez CNP   albuterol sulfate HFA (PROAIR HFA) 108 (90 Base) MCG/ACT inhaler Inhale 2 puffs into the lungs every 6 hours as needed for Wheezing  NICOLE Lopez CNP   fexofenadine (ALLEGRA) 180 MG tablet TAKE 1 TABLET BY MOUTH [address], APRN - CNP        Social History     Tobacco Use    Smoking status: Former Smoker     Types: Cigarettes     Start date: 1982     Last attempt to quit: 1998     Years since quittin.9    Smokeless tobacco: Never Used   Substance Use Topics    Alcohol use: Yes     Comment: occ wine        There were no vitals filed for this visit. Estimated body mass index is 52.13 kg/m² as calculated from the following:    Height as of 10/22/19: 5' 6\" (1.676 m). Weight as of 10/22/19: 323 lb (146.5 kg). Physical exam limited due to virtual visit. Physical Exam  Constitutional:       Appearance: Normal appearance. She is well-developed. She is morbidly obese. HENT:      Head: Normocephalic. Right Ear: Hearing normal.      Left Ear: Hearing normal.   Eyes:      General: Lids are normal. Vision grossly intact. Pulmonary:      Effort: Pulmonary effort is normal.   Neurological:      General: No focal deficit present. Mental Status: She is alert and oriented to person, place, and time. GCS: GCS eye subscore is 4. GCS verbal subscore is 5. GCS motor subscore is 6. Psychiatric:         Attention and Perception: Attention normal.         Mood and Affect: Mood normal.         Speech: Speech normal.         Behavior: Behavior is cooperative. Cognition and Memory: Cognition and memory normal.         ASSESSMENT/PLAN:  1. Essential hypertension  -Continue current medications. - Comprehensive Metabolic Panel; Future  - atenolol (TENORMIN) 50 MG tablet; Take 1 tablet by mouth daily  Dispense: 90 tablet; Refill: 1  - lisinopril-hydroCHLOROthiazide (PRINZIDE;ZESTORETIC) 10-12.5 MG per tablet; Take 1 tablet by mouth daily  Dispense: 90 tablet; Refill: 1    2. Hypothyroidism, unspecified type  -Continue current medications.  - TSH with Reflex; Future  - levothyroxine (SYNTHROID) 25 MCG tablet; Take 1 tablet daily  Dispense: 90 tablet; Refill: 1    3. Attention deficit hyperactivity disorder (ADHD), unspecified ADHD type  -Stop taking Vyvanxe  -Start taking Adderall daily  - amphetamine-dextroamphetamine (ADDERALL, 20MG,) 20 MG tablet; Take 1 tablet by mouth daily for 30 days. Dispense: 30 tablet; Refill: 0  - amphetamine-dextroamphetamine (ADDERALL, 20MG,) 20 MG tablet; Take 1 tablet by mouth daily for 30 days.   Dispense: 30 tablet; Refill: 0  - amphetamine-dextroamphetamine (ADDERALL, 20MG,) 20 MG tablet; Take 1 tablet by mouth daily for 30 days. Dispense: 30 tablet; Refill: 0    4. Screening for deficiency anemia    - CBC Auto Differential; Future    5. Encounter for screening for lipid disorder    - Lipid Panel; Future    6. Encounter for screening mammogram for malignant neoplasm of breast    - ELIJAH DIGITAL SCREEN W OR WO CAD BILATERAL; Future    7. Allergic rhinitis due to pollen, unspecified seasonality  -Continue current medications. 8. Class 3 severe obesity without serious comorbidity with body mass index (BMI) of 50.0 to 59.9 in adult, unspecified obesity type ([facility] 75.)  -The patient is advised to begin progressive daily aerobic exercise program, follow a low fat, low cholesterol diet, attempt to lose weight, reduce salt in diet and cooking, improve dietary compliance, continue current medications and continue current healthy lifestyle patterns. No follow-ups on file. Marisela Pugh is a 48 y.o. female being evaluated by a Virtual Visit (video visit) encounter to address concerns as mentioned above. A caregiver was present when appropriate. Due to this being a TeleHealth encounter (During [facility] public health emergency), evaluation of the following organ systems was limited: Vitals/Constitutional/EENT/Resp/CV/GI//MS/Neuro/Skin/Heme-Lymph-Imm. Pursuant to the emergency declaration under the [facility], [address] [place] [address] authority and the [employer] and [employer] Act, this Virtual Visit was conducted with patient's (and/or legal guardian's) consent, to reduce the patient's risk of exposure to COVID-19 and provide necessary medical care. The patient (and/or legal guardian) has also been advised to contact this office for worsening conditions or problems, and seek emergency medical treatment and/or call 911 if deemed necessary.      Patient

## 2020-12-09 NOTE — PATIENT INSTRUCTIONS
Stop taking Vyvanse  Start taking Adderall 20 mg daily  Have fasting labs drawn within 2 weeks  Make life style changes:    Exercise for at least 30 minutes 3 times weekly. Decrease fatty, fried, fast and processed foods.     Continue all prescribed medications  VV appointment in 3 months

## 2020-12-16 ENCOUNTER — TELEPHONE (OUTPATIENT)
Dept: ADMINISTRATIVE | Age: 55
End: 2020-12-16

## 2021-03-01 DIAGNOSIS — R06.2 WHEEZING: ICD-10-CM

## 2021-03-01 RX ORDER — ALBUTEROL SULFATE 90 UG/1
2 AEROSOL, METERED RESPIRATORY (INHALATION) EVERY 6 HOURS PRN
Qty: 1 INHALER | Refills: 3 | Status: SHIPPED | OUTPATIENT
Start: 2021-03-01 | End: 2022-11-01 | Stop reason: SDUPTHER

## 2021-03-10 ENCOUNTER — TELEPHONE (OUTPATIENT)
Dept: PRIMARY CARE CLINIC | Age: 56
End: 2021-03-10

## 2021-03-10 RX ORDER — CLINDAMYCIN HYDROCHLORIDE 300 MG/1
300 CAPSULE ORAL 3 TIMES DAILY
Qty: 21 CAPSULE | Refills: 0 | Status: SHIPPED | OUTPATIENT
Start: 2021-03-10 | End: 2022-04-04

## 2021-03-11 NOTE — TELEPHONE ENCOUNTER
Rx E scribed to pharmacy  The patient has been notified of this information and all questions answered.

## 2021-03-29 ENCOUNTER — VIRTUAL VISIT (OUTPATIENT)
Dept: PRIMARY CARE CLINIC | Age: 56
End: 2021-03-29
Payer: COMMERCIAL

## 2021-03-29 DIAGNOSIS — I10 ESSENTIAL HYPERTENSION: ICD-10-CM

## 2021-03-29 DIAGNOSIS — F90.9 ATTENTION DEFICIT HYPERACTIVITY DISORDER (ADHD), UNSPECIFIED ADHD TYPE: Primary | ICD-10-CM

## 2021-03-29 DIAGNOSIS — E03.9 HYPOTHYROIDISM, UNSPECIFIED TYPE: ICD-10-CM

## 2021-03-29 PROCEDURE — 99214 OFFICE O/P EST MOD 30 MIN: CPT | Performed by: NURSE PRACTITIONER

## 2021-03-29 RX ORDER — DEXTROAMPHETAMINE SACCHARATE, AMPHETAMINE ASPARTATE, DEXTROAMPHETAMINE SULFATE AND AMPHETAMINE SULFATE 5; 5; 5; 5 MG/1; MG/1; MG/1; MG/1
20 TABLET ORAL DAILY
Qty: 30 TABLET | Refills: 0 | Status: SHIPPED | OUTPATIENT
Start: 2021-03-29 | End: 2021-04-30 | Stop reason: SDUPTHER

## 2021-03-29 ASSESSMENT — ENCOUNTER SYMPTOMS
DIARRHEA: 0
CHEST TIGHTNESS: 0
SHORTNESS OF BREATH: 0
ABDOMINAL PAIN: 0
CONSTIPATION: 0

## 2021-03-29 ASSESSMENT — PATIENT HEALTH QUESTIONNAIRE - PHQ9
SUM OF ALL RESPONSES TO PHQ9 QUESTIONS 1 & 2: 0
SUM OF ALL RESPONSES TO PHQ QUESTIONS 1-9: 0
SUM OF ALL RESPONSES TO PHQ QUESTIONS 1-9: 0
1. LITTLE INTEREST OR PLEASURE IN DOING THINGS: 0
2. FEELING DOWN, DEPRESSED OR HOPELESS: 0

## 2021-03-29 NOTE — PROGRESS NOTES
[address]   [address]  [address] [facility] [place] [address]  Dept: [phone]       3/29/2021   Due to the COVID-19 pandemic restrictions on close contact interactions as recommended by CDC and health authorities, the patient's visit was conducted via telemedicine in lieu of a face to face visit. Patient verbally consented and agreed to proceed. Nasra Byrnes (:  )MX a 48 y.o. female, here for evaluation of the following medical concerns:    HPI  ADD/ADHD:  Current treatment: Adderall 30 mg daily. She is working from home and reports medication is effective.      Hypertension:  Home blood pressure monitoring: No.  She is not adherent to a low sodium diet. Patient denies chest pain, shortness of breath, blurred vision, palpitations and fatigue.  Antihypertensive medication side effects: no medication side effects noted.  Use of agents associated with hypertension: none.       Obesity  She reports unhealthy eating habits. She reports decreasing Soda.  States she has a desire to lose weight.  She reports occasionally doing chair pilate's and walking her dog every morning. Does not believe that she has lost much weight. Discussed making a plan now to what foods to decrease and developing an exercise regimen.                                         Hypothyroidism: Recent symptoms: fatigue, weight gain and dry skin. She denies fatigue and weight gain. Patient is  taking her medication consistently on an empty stomach.     Depression/Anxiety  She reports taking medication as prescribed, with effectiveness. Denies homicidal/suicidal ideations.  Reports walking every morning helps with isolation due to pandemic.         Health Care Maintenance:  Influenza vaccine: out dated, will obtain from local pharmacy  Covid Vaccine: 1 of 2 Moderna  Cervical Cancer screen: Normal PAP 2 years ago per patient report  Breast cancer Screen: due 3/2021     Review of Systems Constitutional: Negative for activity change and fever. HENT: Negative for congestion. Eyes: Negative for visual disturbance. Respiratory: Negative for chest tightness and shortness of breath. Cardiovascular: Negative for chest pain, palpitations and leg swelling. Gastrointestinal: Negative for abdominal pain, constipation and diarrhea. Endocrine: Negative for polyuria. Genitourinary: Negative for dysuria. Musculoskeletal: Negative for arthralgias and myalgias. Skin: Negative for rash. Neurological: Negative for dizziness, light-headedness and headaches. Psychiatric/Behavioral: Negative for agitation, decreased concentration and sleep disturbance. The patient is not nervous/anxious. Prior to Visit Medications    Medication Sig Taking? Authorizing Provider   amphetamine-dextroamphetamine (ADDERALL, 20MG,) 20 MG tablet Take 1 tablet by mouth daily for 30 days. Yes NICOLE Badillo CNP   albuterol sulfate HFA (PROAIR HFA) 108 (90 Base) MCG/ACT inhaler Inhale 2 puffs into the lungs every 6 hours as needed for Wheezing Yes NICOLE Badillo CNP   levothyroxine (SYNTHROID) 25 MCG tablet Take 1 tablet daily Yes NICOLE Badillo CNP   lisinopril-hydroCHLOROthiazide (PRINZIDE;ZESTORETIC) 10-12.5 MG per tablet Take 1 tablet by mouth daily Yes NICOLE Badillo CNP   citalopram (CELEXA) 40 MG tablet 1 daily Yes NICOLE Badillo CNP   atenolol (TENORMIN) 50 MG tablet Take 1 tablet by mouth daily  NICOLE Badillo CNP        Social History     Tobacco Use    Smoking status: Former Smoker     Types: Cigarettes     Start date: 1982     Quit date: 1998     Years since quittin.2    Smokeless tobacco: Never Used   Substance Use Topics    Alcohol use: Yes     Comment: occ wine        There were no vitals filed for this visit.   Estimated body mass index is 52.13 kg/m² as calculated from the following:    Height as of 10/22/19: 5' 6\" (1.676 m). Weight as of 10/22/19: 323 lb (146.5 kg). Physical exam limited due to virtual visit. Physical Exam  Constitutional:       Appearance: Normal appearance. She is well-developed. HENT:      Head: Normocephalic. Right Ear: Hearing normal.      Left Ear: Hearing normal.   Pulmonary:      Effort: Pulmonary effort is normal.   Neurological:      General: No focal deficit present. Mental Status: She is alert and oriented to person, place, and time. Psychiatric:         Behavior: Behavior is cooperative. ASSESSMENT/PLAN:  1. Attention deficit hyperactivity disorder (ADHD), unspecified ADHD type  -Controlled  -Continue taking Adderall 20 mg daily  - amphetamine-dextroamphetamine (ADDERALL, 20MG,) 20 MG tablet; Take 1 tablet by mouth daily for 30 days. Dispense: 30 tablet; Refill: 0    2. Hypothyroidism, unspecified type  -Stable  -Continue current medications. 3. Essential hypertension  -Continue current medications. No follow-ups on file. Brissa Sharp, was evaluated through a synchronous (real-time) audio-video encounter. The patient (or guardian if applicable) is aware that this is a billable service. Verbal consent to proceed has been obtained within the past 12 months. The visit was conducted pursuant to the emergency declaration under the [address] [place] [address], [address] [place] [address] authority and the [employer] Act. Patient identification was verified, and a caregiver was present when appropriate. The patient was located in a state where the provider was credentialed to provide care. Total time spent for this encounter: Not billed by time    --NICOLE Rivera CNP on 3/29/2021 at 6:27 PM    An electronic signature was used to authenticate this note.             --NICOLE Rivera CNP on 3/29/2021 at 6:26 PM

## 2021-04-29 DIAGNOSIS — F90.9 ATTENTION DEFICIT HYPERACTIVITY DISORDER (ADHD), UNSPECIFIED ADHD TYPE: ICD-10-CM

## 2021-04-30 RX ORDER — DEXTROAMPHETAMINE SACCHARATE, AMPHETAMINE ASPARTATE, DEXTROAMPHETAMINE SULFATE AND AMPHETAMINE SULFATE 5; 5; 5; 5 MG/1; MG/1; MG/1; MG/1
20 TABLET ORAL DAILY
Qty: 30 TABLET | Refills: 0 | Status: SHIPPED | OUTPATIENT
Start: 2021-04-30 | End: 2021-06-07 | Stop reason: SDUPTHER

## 2021-06-02 DIAGNOSIS — E03.9 HYPOTHYROIDISM, UNSPECIFIED TYPE: ICD-10-CM

## 2021-06-02 DIAGNOSIS — I10 ESSENTIAL HYPERTENSION: ICD-10-CM

## 2021-06-02 RX ORDER — LISINOPRIL AND HYDROCHLOROTHIAZIDE 12.5; 1 MG/1; MG/1
1 TABLET ORAL DAILY
Qty: 90 TABLET | Refills: 1 | Status: SHIPPED | OUTPATIENT
Start: 2021-06-02 | End: 2021-11-29

## 2021-06-02 RX ORDER — LEVOTHYROXINE SODIUM 0.03 MG/1
TABLET ORAL
Qty: 90 TABLET | Refills: 1 | Status: SHIPPED | OUTPATIENT
Start: 2021-06-02 | End: 2021-11-29

## 2021-06-02 RX ORDER — CITALOPRAM 40 MG/1
TABLET ORAL
Qty: 90 TABLET | Refills: 1 | Status: SHIPPED | OUTPATIENT
Start: 2021-06-02 | End: 2021-11-29

## 2021-06-02 NOTE — TELEPHONE ENCOUNTER
Medication:   Requested Prescriptions     Pending Prescriptions Disp Refills    lisinopril-hydroCHLOROthiazide (PRINZIDE;ZESTORETIC) 10-12.5 MG per tablet [Pharmacy Med Name: LISINOPRIL-HCTZ 10/12.5MG TABLETS] 90 tablet 1     Sig: TAKE 1 TABLET BY MOUTH DAILY    levothyroxine (SYNTHROID) 25 MCG tablet [Pharmacy Med Name: LEVOTHYROXINE 0.025MG (25MCG) TAB] 90 tablet 1     Sig: TAKE 1 TABLET BY MOUTH DAILY    citalopram (CELEXA) 40 MG tablet [Pharmacy Med Name: CITALOPRAM 40MG TABLETS] 90 tablet 1     Sig: TAKE 1 TABLET BY MOUTH DAILY        Last Filled:      Patient Phone Number: [phone] (home) [phone] (work)    Last appt: 3/29/2021   Next appt: Visit date not found    Last OARRS: No flowsheet data found.

## 2021-06-07 ENCOUNTER — TELEPHONE (OUTPATIENT)
Dept: PRIMARY CARE CLINIC | Age: 56
End: 2021-06-07

## 2021-06-07 DIAGNOSIS — F90.9 ATTENTION DEFICIT HYPERACTIVITY DISORDER (ADHD), UNSPECIFIED ADHD TYPE: ICD-10-CM

## 2021-06-07 RX ORDER — DEXTROAMPHETAMINE SACCHARATE, AMPHETAMINE ASPARTATE, DEXTROAMPHETAMINE SULFATE AND AMPHETAMINE SULFATE 5; 5; 5; 5 MG/1; MG/1; MG/1; MG/1
20 TABLET ORAL DAILY
Qty: 30 TABLET | Refills: 0 | Status: SHIPPED | OUTPATIENT
Start: 2021-06-07 | End: 2021-07-06 | Stop reason: SDUPTHER

## 2021-06-07 NOTE — TELEPHONE ENCOUNTER
Medication:   Requested Prescriptions      No prescriptions requested or ordered in this encounter        Last Filled:      Patient Phone Number: [phone] (home) [phone] (work)    Last appt: 3/29/2021   Next appt: Visit date not found    Last OARRS: No flowsheet data found.

## 2021-06-07 NOTE — TELEPHONE ENCOUNTER
Refill on amphetamine-dextroamphetamine (ADDERALL, 20MG,) 20 MG please send to [facility] on [phone]

## 2021-06-23 ENCOUNTER — TELEPHONE (OUTPATIENT)
Dept: ORTHOPEDIC SURGERY | Age: 56
End: 2021-06-23

## 2021-06-23 NOTE — TELEPHONE ENCOUNTER
PA submitted via CM for Amphetamine-Dextroamphetamine 20MG tablets.   Key: [id number] - PA Case ID: [id number]    STATUS: PENDING

## 2021-06-25 NOTE — TELEPHONE ENCOUNTER
Received APPROVAL for Amphetamine-Dextroamphetamine 20MG tablets through 06/23/2022. Approval letter attached. Please advise patient. Thank you!

## 2021-07-06 DIAGNOSIS — F90.9 ATTENTION DEFICIT HYPERACTIVITY DISORDER (ADHD), UNSPECIFIED ADHD TYPE: ICD-10-CM

## 2021-07-08 RX ORDER — DEXTROAMPHETAMINE SACCHARATE, AMPHETAMINE ASPARTATE, DEXTROAMPHETAMINE SULFATE AND AMPHETAMINE SULFATE 5; 5; 5; 5 MG/1; MG/1; MG/1; MG/1
20 TABLET ORAL DAILY
Qty: 30 TABLET | Refills: 0 | Status: SHIPPED | OUTPATIENT
Start: 2021-07-08 | End: 2021-08-11 | Stop reason: SDUPTHER

## 2021-08-11 DIAGNOSIS — F90.9 ATTENTION DEFICIT HYPERACTIVITY DISORDER (ADHD), UNSPECIFIED ADHD TYPE: ICD-10-CM

## 2021-08-11 RX ORDER — DEXTROAMPHETAMINE SACCHARATE, AMPHETAMINE ASPARTATE, DEXTROAMPHETAMINE SULFATE AND AMPHETAMINE SULFATE 5; 5; 5; 5 MG/1; MG/1; MG/1; MG/1
20 TABLET ORAL DAILY
Qty: 30 TABLET | Refills: 0 | Status: SHIPPED | OUTPATIENT
Start: 2021-08-11 | End: 2021-09-10 | Stop reason: SDUPTHER

## 2021-09-02 DIAGNOSIS — I10 ESSENTIAL HYPERTENSION: ICD-10-CM

## 2021-09-02 RX ORDER — ATENOLOL 50 MG/1
50 TABLET ORAL DAILY
Qty: 90 TABLET | Refills: 1 | Status: SHIPPED | OUTPATIENT
Start: 2021-09-02 | End: 2022-01-12

## 2021-09-10 DIAGNOSIS — F90.9 ATTENTION DEFICIT HYPERACTIVITY DISORDER (ADHD), UNSPECIFIED ADHD TYPE: ICD-10-CM

## 2021-09-13 RX ORDER — DEXTROAMPHETAMINE SACCHARATE, AMPHETAMINE ASPARTATE, DEXTROAMPHETAMINE SULFATE AND AMPHETAMINE SULFATE 5; 5; 5; 5 MG/1; MG/1; MG/1; MG/1
20 TABLET ORAL DAILY
Qty: 30 TABLET | Refills: 0 | Status: SHIPPED | OUTPATIENT
Start: 2021-09-13 | End: 2021-10-15 | Stop reason: SDUPTHER

## 2021-09-13 NOTE — TELEPHONE ENCOUNTER
Medication:   Requested Prescriptions     Pending Prescriptions Disp Refills    amphetamine-dextroamphetamine (ADDERALL, 20MG,) 20 MG tablet 30 tablet 0     Sig: Take 1 tablet by mouth daily for 30 days. Last Filled:      Patient Phone Number: [phone] (home) [phone] (work)    Last appt: 3/29/2021   Next appt: Visit date not found    Last OARRS: No flowsheet data found.

## 2021-10-15 DIAGNOSIS — F90.9 ATTENTION DEFICIT HYPERACTIVITY DISORDER (ADHD), UNSPECIFIED ADHD TYPE: ICD-10-CM

## 2021-10-15 RX ORDER — DEXTROAMPHETAMINE SACCHARATE, AMPHETAMINE ASPARTATE, DEXTROAMPHETAMINE SULFATE AND AMPHETAMINE SULFATE 5; 5; 5; 5 MG/1; MG/1; MG/1; MG/1
20 TABLET ORAL DAILY
Qty: 30 TABLET | Refills: 0 | Status: SHIPPED | OUTPATIENT
Start: 2021-10-15 | End: 2021-11-22 | Stop reason: SDUPTHER

## 2021-10-15 NOTE — TELEPHONE ENCOUNTER
Medication:   Requested Prescriptions     Pending Prescriptions Disp Refills    amphetamine-dextroamphetamine (ADDERALL, 20MG,) 20 MG tablet 30 tablet 0     Sig: Take 1 tablet by mouth daily for 30 days. Last Filled:      Patient Phone Number: [phone] (home) [phone] (work)    Last appt: 3/29/2021   Next appt: Visit date not found    Last OARRS: No flowsheet data found.

## 2021-11-22 DIAGNOSIS — F90.9 ATTENTION DEFICIT HYPERACTIVITY DISORDER (ADHD), UNSPECIFIED ADHD TYPE: ICD-10-CM

## 2021-11-22 RX ORDER — DEXTROAMPHETAMINE SACCHARATE, AMPHETAMINE ASPARTATE, DEXTROAMPHETAMINE SULFATE AND AMPHETAMINE SULFATE 5; 5; 5; 5 MG/1; MG/1; MG/1; MG/1
20 TABLET ORAL DAILY
Qty: 30 TABLET | Refills: 0 | Status: SHIPPED | OUTPATIENT
Start: 2021-11-22 | End: 2022-01-03 | Stop reason: SDUPTHER

## 2021-11-22 NOTE — TELEPHONE ENCOUNTER
Medication:   Requested Prescriptions     Pending Prescriptions Disp Refills    amphetamine-dextroamphetamine (ADDERALL, 20MG,) 20 MG tablet 30 tablet 0     Sig: Take 1 tablet by mouth daily for 30 days. Last Filled:      Patient Phone Number: [phone] (home) [phone] (work)    Last appt: 3/29/2021   Next appt: Visit date not found    Last OARRS: No flowsheet data found.

## 2021-11-29 DIAGNOSIS — E03.9 HYPOTHYROIDISM, UNSPECIFIED TYPE: ICD-10-CM

## 2021-11-29 DIAGNOSIS — I10 ESSENTIAL HYPERTENSION: ICD-10-CM

## 2021-11-29 RX ORDER — CITALOPRAM 40 MG/1
TABLET ORAL
Qty: 90 TABLET | Refills: 1 | Status: SHIPPED | OUTPATIENT
Start: 2021-11-29 | End: 2022-05-31

## 2021-11-29 RX ORDER — LEVOTHYROXINE SODIUM 0.03 MG/1
TABLET ORAL
Qty: 90 TABLET | Refills: 1 | Status: SHIPPED | OUTPATIENT
Start: 2021-11-29 | End: 2022-05-31

## 2021-11-29 RX ORDER — LISINOPRIL AND HYDROCHLOROTHIAZIDE 12.5; 1 MG/1; MG/1
1 TABLET ORAL DAILY
Qty: 90 TABLET | Refills: 1 | Status: SHIPPED | OUTPATIENT
Start: 2021-11-29 | End: 2022-05-31

## 2021-11-29 NOTE — TELEPHONE ENCOUNTER
Medication:   Requested Prescriptions     Pending Prescriptions Disp Refills    lisinopril-hydroCHLOROthiazide (PRINZIDE;ZESTORETIC) 10-12.5 MG per tablet [Pharmacy Med Name: LISINOPRIL-HCTZ 10/12.5MG TABLETS] 90 tablet 1     Sig: TAKE 1 TABLET BY MOUTH DAILY    levothyroxine (SYNTHROID) 25 MCG tablet [Pharmacy Med Name: LEVOTHYROXINE 0.025MG (25MCG) TAB] 90 tablet 1     Sig: TAKE 1 TABLET BY MOUTH DAILY    citalopram (CELEXA) 40 MG tablet [Pharmacy Med Name: CITALOPRAM 40MG TABLETS] 90 tablet 1     Sig: TAKE 1 TABLET BY MOUTH DAILY       Last Filled:      Patient Phone Number: [phone] (home) [phone] (work)    Last appt: 3/29/2021   Next appt: Visit date not found    Last Labs DM: No results found for: Carolina Garcia

## 2022-01-03 DIAGNOSIS — F90.9 ATTENTION DEFICIT HYPERACTIVITY DISORDER (ADHD), UNSPECIFIED ADHD TYPE: ICD-10-CM

## 2022-01-03 RX ORDER — DEXTROAMPHETAMINE SACCHARATE, AMPHETAMINE ASPARTATE, DEXTROAMPHETAMINE SULFATE AND AMPHETAMINE SULFATE 5; 5; 5; 5 MG/1; MG/1; MG/1; MG/1
20 TABLET ORAL DAILY
Qty: 30 TABLET | Refills: 0 | Status: SHIPPED | OUTPATIENT
Start: 2022-01-03 | End: 2022-02-03 | Stop reason: SDUPTHER

## 2022-01-12 DIAGNOSIS — I10 ESSENTIAL HYPERTENSION: ICD-10-CM

## 2022-01-12 RX ORDER — ATENOLOL 50 MG/1
50 TABLET ORAL DAILY
Qty: 90 TABLET | Refills: 1 | Status: SHIPPED | OUTPATIENT
Start: 2022-01-12 | End: 2022-09-21

## 2022-01-12 NOTE — TELEPHONE ENCOUNTER
Local pharmacy      Medication:   Requested Prescriptions     Pending Prescriptions Disp Refills    atenolol (TENORMIN) 50 MG tablet [Pharmacy Med Name: ATENOLOL 50MG TABLETS] 90 tablet 1     Sig: TAKE 1 TABLET BY MOUTH DAILY        Last Filled:      Patient Phone Number: [phone] (home) [phone] (work)    Last appt: 3/29/2021   Next appt: Visit date not found    Last OARRS: No flowsheet data found.

## 2022-01-25 ENCOUNTER — TELEPHONE (OUTPATIENT)
Dept: PRIMARY CARE CLINIC | Age: 57
End: 2022-01-25

## 2022-02-03 DIAGNOSIS — F90.9 ATTENTION DEFICIT HYPERACTIVITY DISORDER (ADHD), UNSPECIFIED ADHD TYPE: ICD-10-CM

## 2022-02-04 DIAGNOSIS — Z13.220 SCREENING FOR LIPID DISORDERS: ICD-10-CM

## 2022-02-04 DIAGNOSIS — E03.9 HYPOTHYROIDISM, UNSPECIFIED TYPE: ICD-10-CM

## 2022-02-04 DIAGNOSIS — I10 PRIMARY HYPERTENSION: Primary | ICD-10-CM

## 2022-02-04 DIAGNOSIS — Z13.0 SCREENING, ANEMIA, DEFICIENCY, IRON: ICD-10-CM

## 2022-02-04 DIAGNOSIS — Z11.59 NEED FOR HEPATITIS C SCREENING TEST: ICD-10-CM

## 2022-02-04 DIAGNOSIS — Z13.1 SCREENING FOR DIABETES MELLITUS: ICD-10-CM

## 2022-02-04 RX ORDER — DEXTROAMPHETAMINE SACCHARATE, AMPHETAMINE ASPARTATE, DEXTROAMPHETAMINE SULFATE AND AMPHETAMINE SULFATE 5; 5; 5; 5 MG/1; MG/1; MG/1; MG/1
20 TABLET ORAL DAILY
Qty: 30 TABLET | Refills: 0 | Status: SHIPPED | OUTPATIENT
Start: 2022-02-04 | End: 2022-03-03 | Stop reason: SDUPTHER

## 2022-02-25 ENCOUNTER — HOSPITAL ENCOUNTER (OUTPATIENT)
Dept: MAMMOGRAPHY | Age: 57
Discharge: HOME | End: 2022-02-25
Payer: COMMERCIAL

## 2022-02-25 VITALS — HEIGHT: 68 IN | WEIGHT: 293 LBS | BODY MASS INDEX: 44.41 KG/M2

## 2022-02-25 DIAGNOSIS — Z12.31 BREAST CANCER SCREENING BY MAMMOGRAM: ICD-10-CM

## 2022-02-25 DIAGNOSIS — Z11.59 NEED FOR HEPATITIS C SCREENING TEST: ICD-10-CM

## 2022-02-25 DIAGNOSIS — Z13.1 SCREENING FOR DIABETES MELLITUS: ICD-10-CM

## 2022-02-25 DIAGNOSIS — Z13.0 SCREENING, ANEMIA, DEFICIENCY, IRON: ICD-10-CM

## 2022-02-25 DIAGNOSIS — E03.9 HYPOTHYROIDISM, UNSPECIFIED TYPE: ICD-10-CM

## 2022-02-25 DIAGNOSIS — Z13.220 SCREENING FOR LIPID DISORDERS: ICD-10-CM

## 2022-02-25 LAB
A/G RATIO: 1.1 (ref 1.1–2.2)
ALBUMIN SERPL-MCNC: 4 G/DL (ref 3.4–5)
ALP BLD-CCNC: 133 U/L (ref 40–129)
ALT SERPL-CCNC: 30 U/L (ref 10–40)
ANION GAP SERPL CALCULATED.3IONS-SCNC: 17 MMOL/L (ref 3–16)
AST SERPL-CCNC: 31 U/L (ref 15–37)
BASOPHILS ABSOLUTE: 0.1 K/UL (ref 0–0.2)
BASOPHILS RELATIVE PERCENT: 0.8 %
BILIRUB SERPL-MCNC: 0.8 MG/DL (ref 0–1)
BUN BLDV-MCNC: 11 MG/DL (ref 7–20)
CALCIUM SERPL-MCNC: 10 MG/DL (ref 8.3–10.6)
CHLORIDE BLD-SCNC: 97 MMOL/L (ref 99–110)
CHOLESTEROL, TOTAL: 196 MG/DL (ref 0–199)
CO2: 23 MMOL/L (ref 21–32)
CREAT SERPL-MCNC: 0.6 MG/DL (ref 0.6–1.1)
EOSINOPHILS ABSOLUTE: 0.2 K/UL (ref 0–0.6)
EOSINOPHILS RELATIVE PERCENT: 3.3 %
GFR AFRICAN AMERICAN: >60
GFR NON-AFRICAN AMERICAN: >60
GLUCOSE BLD-MCNC: 123 MG/DL (ref 70–99)
HCT VFR BLD CALC: 41 % (ref 36–48)
HDLC SERPL-MCNC: 42 MG/DL (ref 40–60)
HEMOGLOBIN: 13.7 G/DL (ref 12–16)
HEPATITIS C ANTIBODY INTERPRETATION: NORMAL
LDL CHOLESTEROL CALCULATED: 133 MG/DL
LYMPHOCYTES ABSOLUTE: 2.5 K/UL (ref 1–5.1)
LYMPHOCYTES RELATIVE PERCENT: 36.4 %
MCH RBC QN AUTO: 34.2 PG (ref 26–34)
MCHC RBC AUTO-ENTMCNC: 33.5 G/DL (ref 31–36)
MCV RBC AUTO: 102.1 FL (ref 80–100)
MONOCYTES ABSOLUTE: 0.5 K/UL (ref 0–1.3)
MONOCYTES RELATIVE PERCENT: 6.7 %
NEUTROPHILS ABSOLUTE: 3.7 K/UL (ref 1.7–7.7)
NEUTROPHILS RELATIVE PERCENT: 52.8 %
PDW BLD-RTO: 13.1 % (ref 12.4–15.4)
PLATELET # BLD: 350 K/UL (ref 135–450)
PMV BLD AUTO: 8 FL (ref 5–10.5)
POTASSIUM SERPL-SCNC: 4.4 MMOL/L (ref 3.5–5.1)
RBC # BLD: 4.02 M/UL (ref 4–5.2)
SODIUM BLD-SCNC: 137 MMOL/L (ref 136–145)
T4 FREE: 1.2 NG/DL (ref 0.9–1.8)
TOTAL PROTEIN: 7.8 G/DL (ref 6.4–8.2)
TRIGL SERPL-MCNC: 105 MG/DL (ref 0–150)
TSH REFLEX: 2.97 UIU/ML (ref 0.27–4.2)
VLDLC SERPL CALC-MCNC: 21 MG/DL
WBC # BLD: 6.9 K/UL (ref 4–11)

## 2022-02-25 PROCEDURE — 77063 BREAST TOMOSYNTHESIS BI: CPT

## 2022-03-01 DIAGNOSIS — R92.8 ABNORMAL MAMMOGRAM: Primary | ICD-10-CM

## 2022-03-03 ENCOUNTER — OFFICE VISIT (OUTPATIENT)
Dept: PRIMARY CARE CLINIC | Age: 57
End: 2022-03-03
Payer: COMMERCIAL

## 2022-03-03 VITALS
HEART RATE: 78 BPM | HEIGHT: 68 IN | DIASTOLIC BLOOD PRESSURE: 78 MMHG | TEMPERATURE: 97.4 F | BODY MASS INDEX: 44.41 KG/M2 | OXYGEN SATURATION: 96 % | WEIGHT: 293 LBS | SYSTOLIC BLOOD PRESSURE: 125 MMHG

## 2022-03-03 DIAGNOSIS — E11.69 TYPE 2 DIABETES MELLITUS WITH HYPERLIPIDEMIA (HCC): ICD-10-CM

## 2022-03-03 DIAGNOSIS — E66.9 OBESITY, DIABETES, AND HYPERTENSION SYNDROME (HCC): Primary | ICD-10-CM

## 2022-03-03 DIAGNOSIS — I15.2 OBESITY, DIABETES, AND HYPERTENSION SYNDROME (HCC): Primary | ICD-10-CM

## 2022-03-03 DIAGNOSIS — E78.5 TYPE 2 DIABETES MELLITUS WITH HYPERLIPIDEMIA (HCC): ICD-10-CM

## 2022-03-03 DIAGNOSIS — E11.59 OBESITY, DIABETES, AND HYPERTENSION SYNDROME (HCC): Primary | ICD-10-CM

## 2022-03-03 DIAGNOSIS — R73.09 ELEVATED GLUCOSE: ICD-10-CM

## 2022-03-03 DIAGNOSIS — F90.9 ATTENTION DEFICIT HYPERACTIVITY DISORDER (ADHD), UNSPECIFIED ADHD TYPE: ICD-10-CM

## 2022-03-03 DIAGNOSIS — E11.69 OBESITY, DIABETES, AND HYPERTENSION SYNDROME (HCC): Primary | ICD-10-CM

## 2022-03-03 LAB — HBA1C MFR BLD: 8.6 %

## 2022-03-03 PROCEDURE — 99214 OFFICE O/P EST MOD 30 MIN: CPT | Performed by: NURSE PRACTITIONER

## 2022-03-03 PROCEDURE — 3052F HG A1C>EQUAL 8.0%<EQUAL 9.0%: CPT | Performed by: NURSE PRACTITIONER

## 2022-03-03 PROCEDURE — 83036 HEMOGLOBIN GLYCOSYLATED A1C: CPT | Performed by: NURSE PRACTITIONER

## 2022-03-03 RX ORDER — DEXTROAMPHETAMINE SACCHARATE, AMPHETAMINE ASPARTATE, DEXTROAMPHETAMINE SULFATE AND AMPHETAMINE SULFATE 5; 5; 5; 5 MG/1; MG/1; MG/1; MG/1
20 TABLET ORAL DAILY
Qty: 30 TABLET | Refills: 0 | Status: SHIPPED | OUTPATIENT
Start: 2022-03-03 | End: 2022-04-20 | Stop reason: SDUPTHER

## 2022-03-03 SDOH — ECONOMIC STABILITY: FOOD INSECURITY: WITHIN THE PAST 12 MONTHS, THE FOOD YOU BOUGHT JUST DIDN'T LAST AND YOU DIDN'T HAVE MONEY TO GET MORE.: NEVER TRUE

## 2022-03-03 SDOH — ECONOMIC STABILITY: FOOD INSECURITY: WITHIN THE PAST 12 MONTHS, YOU WORRIED THAT YOUR FOOD WOULD RUN OUT BEFORE YOU GOT MONEY TO BUY MORE.: NEVER TRUE

## 2022-03-03 ASSESSMENT — ENCOUNTER SYMPTOMS
CONSTIPATION: 0
SHORTNESS OF BREATH: 0
DIARRHEA: 0
CHEST TIGHTNESS: 0
ABDOMINAL PAIN: 0

## 2022-03-03 ASSESSMENT — SOCIAL DETERMINANTS OF HEALTH (SDOH): HOW HARD IS IT FOR YOU TO PAY FOR THE VERY BASICS LIKE FOOD, HOUSING, MEDICAL CARE, AND HEATING?: NOT HARD AT ALL

## 2022-03-03 NOTE — PROGRESS NOTES
Mary Ann Lozano (:  1965) is a 55 y.o. female,Established patient, here for evaluation of the following chief complaint(s):  Follow-up         ASSESSMENT/PLAN:  1. Obesity, diabetes, and hypertension syndrome (HCC)-Newly diagnosed diabetes. Hypertension well controlled on present medication regimen  -     POCT glycosylated hemoglobin (Hb A1C):8.6    -Start taking Metformin BID     metFORMIN (GLUCOPHAGE) 500 MG tablet; Take 1 tablet by mouth 2 times daily (with meals), Disp-180 tablet, R-1Normal  -Continue Prinzide at present dose  -Decrease sugary foods, drinks, starches  -Exercise 4-5 times weekly for 45 minutes  2. Attention deficit hyperactivity disorder (ADHD), unspecified ADHD type- well controlled on present medication regimen  -  Continue Adderall 20 mg daily   amphetamine-dextroamphetamine (ADDERALL, 20MG,) 20 MG tablet; Take 1 tablet by mouth daily for 30 days. , Disp-30 tablet, R-0Normal  3. Elevated glucose  -     POCT glycosylated hemoglobin (Hb A1C): 8.6%  4. Type 2 diabetes mellitus with hyperlipidemia (HCC)-Newly diagnosed diabetes  Start taking Metformin BID  -     POCT glycosylated hemoglobin (Hb A1C): 8.6%  -     metFORMIN (GLUCOPHAGE) 500 MG tablet; Take 1 tablet by mouth 2 times daily (with meals), Disp-180 tablet, R-1Normal  -consider cholesterol lowering medications      Return in about 3 months (around 6/3/2022) for DIABETES. Subjective   SUBJECTIVE/OBJECTIVE:  Diabetes  She presents for her initial diabetic visit. She has type 2 diabetes mellitus. There are no hypoglycemic associated symptoms. Pertinent negatives for hypoglycemia include no dizziness, headaches or nervousness/anxiousness. There are no diabetic associated symptoms. Pertinent negatives for diabetes include no chest pain and no polyuria. There are no hypoglycemic complications. Diabetic symptom progression: 170's blood sugar at home.  A1C 8.6% Risk factors for coronary artery disease include diabetes mellitus, dyslipidemia, obesity, hypertension and sedentary lifestyle. She rarely participates in exercise. An ACE inhibitor/angiotensin II receptor blocker is being taken. ADD/ADHD:  Current treatment: Adderall 30 mg daily. She is working from home and reports medication is effective.      Hypertension:  Home blood pressure monitoring: No.  She is not adherent to a low sodium diet. Patient denies chest pain, shortness of breath, blurred vision, palpitations and fatigue.  Antihypertensive medication side effects: no medication side effects noted.  Use of agents associated with hypertension: none.       Obesity  She reports unhealthy eating habits. She reports decreasing Soda.  States she has a desire to lose weight.  She reports occasionally doing chair pilate's and walking her dog every morning. Does not believe that she has lost much weight. Discussed making a plan now to what foods to decrease and developing an exercise regimen.                                         Hypothyroidism: Recent symptoms: fatigue, weight gain and dry skin. She denies fatigue and weight gain. Patient is  taking her medication consistently on an empty stomach.     Depression/Anxiety  She reports taking medication as prescribed, with effectiveness. Denies homicidal/suicidal ideations. Reports walking every morning helps with isolation due to pandemic.      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 Scheuled diagnostic mammogram Wednesday. Review of Systems   Constitutional: Negative for activity change and fever. HENT: Negative for congestion. Eyes: Negative for visual disturbance. Respiratory: Negative for chest tightness and shortness of breath. Cardiovascular: Negative for chest pain, palpitations and leg swelling. Hypertension, hyperlipdemia   Gastrointestinal: Negative for abdominal pain, constipation and diarrhea. Endocrine: Negative for polyuria. Diabetes   Genitourinary: Negative for dysuria. Musculoskeletal: Negative for arthralgias and myalgias. Skin: Negative for rash. Neurological: Negative for dizziness, light-headedness and headaches. Psychiatric/Behavioral: Negative for agitation, decreased concentration and sleep disturbance. The patient is not nervous/anxious. Depression  ADHD  FINESSE          Objective    Patient Active Problem List   Diagnosis    FINESSE (generalized anxiety disorder)    Hypothyroidism    ADHD (attention deficit hyperactivity disorder)     Outpatient Encounter Medications as of 3/3/2022   Medication Sig Dispense Refill    metFORMIN (GLUCOPHAGE) 500 MG tablet Take 1 tablet by mouth 2 times daily (with meals) 180 tablet 1    amphetamine-dextroamphetamine (ADDERALL, 20MG,) 20 MG tablet Take 1 tablet by mouth daily for 30 days. 30 tablet 0    lisinopril-hydroCHLOROthiazide (PRINZIDE;ZESTORETIC) 10-12.5 MG per tablet TAKE 1 TABLET BY MOUTH DAILY 90 tablet 1    levothyroxine (SYNTHROID) 25 MCG tablet TAKE 1 TABLET BY MOUTH DAILY 90 tablet 1    citalopram (CELEXA) 40 MG tablet TAKE 1 TABLET BY MOUTH DAILY 90 tablet 1    albuterol sulfate HFA (PROAIR HFA) 108 (90 Base) MCG/ACT inhaler Inhale 2 puffs into the lungs every 6 hours as needed for Wheezing 1 Inhaler 3    [DISCONTINUED] amphetamine-dextroamphetamine (ADDERALL, 20MG,) 20 MG tablet Take 1 tablet by mouth daily for 30 days. 30 tablet 0    atenolol (TENORMIN) 50 MG tablet TAKE 1 TABLET BY MOUTH DAILY 90 tablet 1     No facility-administered encounter medications on file as of 3/3/2022.    height is 5' 8\" (1.727 m) and weight is 314 lb (142.4 kg) (abnormal). Her temperature is 97.4 °F (36.3 °C). Her blood pressure is 125/78 and her pulse is 78. Her oxygen saturation is 96%. Physical Exam  Vitals reviewed. Constitutional:       Appearance: She is well-developed. HENT:      Head: Normocephalic.       Right Ear: Hearing and external ear normal.      Left Ear: Hearing and external ear normal.      Nose: Nose normal.   Eyes:      General: Lids are normal.   Cardiovascular:      Rate and Rhythm: Normal rate and regular rhythm. Heart sounds: Normal heart sounds, S1 normal and S2 normal.   Pulmonary:      Effort: Pulmonary effort is normal.      Breath sounds: Normal breath sounds. Musculoskeletal:         General: Normal range of motion. Skin:     General: Skin is warm and dry. Findings: No rash. Neurological:      Mental Status: She is alert and oriented to person, place, and time. GCS: GCS eye subscore is 4. GCS verbal subscore is 5. GCS motor subscore is 6. Psychiatric:         Speech: Speech normal.         Behavior: Behavior normal. Behavior is cooperative. On this date 3/3/2022 I have spent 25 minutes reviewing previous notes, test results and face to face with the patient discussing the diagnosis and importance of compliance with the treatment plan as well as documenting on the day of the visit. An electronic signature was used to authenticate this note.     --NICOLE Trujillo - CNP

## 2022-03-09 ENCOUNTER — PRE-PROCEDURE TELEPHONE (OUTPATIENT)
Dept: WOMENS IMAGING | Age: 57
End: 2022-03-09

## 2022-03-09 ENCOUNTER — HOSPITAL ENCOUNTER (OUTPATIENT)
Dept: ULTRASOUND IMAGING | Age: 57
Discharge: HOME | End: 2022-03-09
Payer: COMMERCIAL

## 2022-03-09 DIAGNOSIS — R92.8 ABNORMAL MAMMOGRAM: ICD-10-CM

## 2022-03-09 PROCEDURE — 76641 ULTRASOUND BREAST COMPLETE: CPT

## 2022-03-09 NOTE — TELEPHONE ENCOUNTER
Nurse Navigator reviewed pre-procedure ultrasound guided breast biopsy patient education information in person and gave written instructions. Reviewed medications and need to hold all blood thinners per instructions. Nothing to hold. Patient should take all other medications as prescribed. Patient can eat and drink as normal prior to the procedure. Be sure to wear a bra with good support and a two piece outfit for comfort. Patient can bring someone with you but you can also drive yourself. Plan on being at the breast center for 2-2 and a half hours. Reviewed the process of a ultrasound biopsy. The skin is cleaned and a local anesthetic is given to numb the area. A small skin nick is made for the biopsy needle and then tissue samples are taken. A tiny marker is then placed inside your breast at the site of the biopsy for future reference. Pressure is then held on the biopsy site to stop bleeding and steri strips, bandage and waterproof dressing is applied. A mammogram is then done to validate the tissue marker. The tissue sample is sent to pathology. Results will come back in 2-3 business days and sent to your referring physician. Either they or the nurse navigator will call you with the results and recommended follow up needed. Patients states understanding.

## 2022-03-10 ENCOUNTER — TELEPHONE (OUTPATIENT)
Dept: PRIMARY CARE CLINIC | Age: 57
End: 2022-03-10

## 2022-03-10 DIAGNOSIS — E11.59 OBESITY, DIABETES, AND HYPERTENSION SYNDROME (HCC): ICD-10-CM

## 2022-03-10 DIAGNOSIS — I15.2 OBESITY, DIABETES, AND HYPERTENSION SYNDROME (HCC): ICD-10-CM

## 2022-03-10 DIAGNOSIS — E78.5 TYPE 2 DIABETES MELLITUS WITH HYPERLIPIDEMIA (HCC): ICD-10-CM

## 2022-03-10 DIAGNOSIS — E11.69 OBESITY, DIABETES, AND HYPERTENSION SYNDROME (HCC): ICD-10-CM

## 2022-03-10 DIAGNOSIS — E11.69 TYPE 2 DIABETES MELLITUS WITH HYPERLIPIDEMIA (HCC): ICD-10-CM

## 2022-03-10 DIAGNOSIS — E66.9 OBESITY, DIABETES, AND HYPERTENSION SYNDROME (HCC): ICD-10-CM

## 2022-03-10 DIAGNOSIS — R59.9 ENLARGED LYMPH NODE: Primary | ICD-10-CM

## 2022-03-10 NOTE — TELEPHONE ENCOUNTER
Erica Maddox called in from [facility] @ [address] [place] [address] Pt IGM code is 8255 for Lymph node guided ultra sound of left side biopsy pt is scheduled for March 15 @ [facility] @ 2:00 pm if any question call Erica Maddox @ [phone] please be advise

## 2022-03-14 ENCOUNTER — TELEPHONE (OUTPATIENT)
Dept: WOMENS IMAGING | Age: 57
End: 2022-03-14

## 2022-03-14 NOTE — TELEPHONE ENCOUNTER
Patient called to reschedule biopsy. Possible shingles? ?  Rash,  underneath breast and has spread under arm. Painful and burning  Going to Urgent care and will advise. Biopsy rescheduled for April 1st, 1245.

## 2022-03-23 DIAGNOSIS — R92.8 ABNORMAL SCREENING MAMMOGRAM: Primary | ICD-10-CM

## 2022-04-01 ENCOUNTER — TELEPHONE (OUTPATIENT)
Dept: WOMENS IMAGING | Age: 57
End: 2022-04-01

## 2022-04-04 ENCOUNTER — TELEPHONE (OUTPATIENT)
Dept: WOMENS IMAGING | Age: 57
End: 2022-04-04

## 2022-04-04 RX ORDER — CLINDAMYCIN HYDROCHLORIDE 300 MG/1
CAPSULE ORAL
Qty: 21 CAPSULE | Refills: 0 | Status: SHIPPED | OUTPATIENT
Start: 2022-04-04 | End: 2022-08-08

## 2022-04-04 NOTE — TELEPHONE ENCOUNTER
Left message for patient on VM requesting return call. Reaching out to reschedule biopsy from missed appointment on April 1.

## 2022-04-06 ENCOUNTER — TELEPHONE (OUTPATIENT)
Dept: WOMENS IMAGING | Age: 57
End: 2022-04-06

## 2022-04-06 NOTE — TELEPHONE ENCOUNTER
Left message for patient on VM requesting return call. Reaching out to reschedule biopsy from missed appointment on April 1. NN reached out to NP to make aware of missed appointment.

## 2022-04-11 DIAGNOSIS — E66.9 OBESITY, DIABETES, AND HYPERTENSION SYNDROME (HCC): Primary | ICD-10-CM

## 2022-04-11 DIAGNOSIS — E11.69 OBESITY, DIABETES, AND HYPERTENSION SYNDROME (HCC): Primary | ICD-10-CM

## 2022-04-11 DIAGNOSIS — E11.59 OBESITY, DIABETES, AND HYPERTENSION SYNDROME (HCC): Primary | ICD-10-CM

## 2022-04-11 DIAGNOSIS — I15.2 OBESITY, DIABETES, AND HYPERTENSION SYNDROME (HCC): Primary | ICD-10-CM

## 2022-04-19 ENCOUNTER — HOSPITAL ENCOUNTER (OUTPATIENT)
Dept: ULTRASOUND IMAGING | Age: 57
Discharge: HOME | End: 2022-04-19
Payer: COMMERCIAL

## 2022-04-19 DIAGNOSIS — R59.9 ENLARGED LYMPH NODE: ICD-10-CM

## 2022-04-19 PROCEDURE — 76882 US LMTD JT/FCL EVL NVASC XTR: CPT

## 2022-04-19 RX ORDER — PIOGLITAZONEHYDROCHLORIDE 30 MG/1
30 TABLET ORAL DAILY
Qty: 30 TABLET | Refills: 3 | Status: SHIPPED | OUTPATIENT
Start: 2022-04-19 | End: 2022-11-01 | Stop reason: SDUPTHER

## 2022-04-19 RX ORDER — LIDOCAINE HYDROCHLORIDE 10 MG/ML
5 INJECTION, SOLUTION EPIDURAL; INFILTRATION; INTRACAUDAL; PERINEURAL ONCE
Status: DISCONTINUED | OUTPATIENT
Start: 2022-04-19 | End: 2022-04-20 | Stop reason: HOSPADM

## 2022-04-19 RX ORDER — PIOGLITAZONEHYDROCHLORIDE 30 MG/1
30 TABLET ORAL DAILY
Qty: 90 TABLET | OUTPATIENT
Start: 2022-04-19

## 2022-04-19 RX ORDER — LIDOCAINE HYDROCHLORIDE AND EPINEPHRINE 10; 10 MG/ML; UG/ML
20 INJECTION, SOLUTION INFILTRATION; PERINEURAL ONCE
Status: DISCONTINUED | OUTPATIENT
Start: 2022-04-19 | End: 2022-04-20 | Stop reason: HOSPADM

## 2022-04-19 NOTE — PROGRESS NOTES
Patient here for breast biopsy. NN reviewed the health history, allergies and medications. Drove herself. Radiologist reviews procedure with patient, consent signed. Ultrasound completed before biopsy. Biopsy canceled. Patient discharged.

## 2022-04-20 DIAGNOSIS — F90.9 ATTENTION DEFICIT HYPERACTIVITY DISORDER (ADHD), UNSPECIFIED ADHD TYPE: ICD-10-CM

## 2022-04-20 RX ORDER — DEXTROAMPHETAMINE SACCHARATE, AMPHETAMINE ASPARTATE, DEXTROAMPHETAMINE SULFATE AND AMPHETAMINE SULFATE 5; 5; 5; 5 MG/1; MG/1; MG/1; MG/1
20 TABLET ORAL DAILY
Qty: 30 TABLET | Refills: 0 | Status: SHIPPED | OUTPATIENT
Start: 2022-04-20 | End: 2022-05-23 | Stop reason: SDUPTHER

## 2022-04-20 NOTE — TELEPHONE ENCOUNTER
Medication:   Requested Prescriptions     Pending Prescriptions Disp Refills    amphetamine-dextroamphetamine (ADDERALL, 20MG,) 20 MG tablet 30 tablet 0     Sig: Take 1 tablet by mouth daily for 30 days. Last Filled:      Patient Phone Number: [phone] (home)     Last appt: 3/3/2022   Next appt: 6/6/2022    Last OARRS: No flowsheet data found.

## 2022-05-23 DIAGNOSIS — F90.9 ATTENTION DEFICIT HYPERACTIVITY DISORDER (ADHD), UNSPECIFIED ADHD TYPE: ICD-10-CM

## 2022-05-23 RX ORDER — DEXTROAMPHETAMINE SACCHARATE, AMPHETAMINE ASPARTATE, DEXTROAMPHETAMINE SULFATE AND AMPHETAMINE SULFATE 5; 5; 5; 5 MG/1; MG/1; MG/1; MG/1
20 TABLET ORAL DAILY
Qty: 30 TABLET | Refills: 0 | Status: SHIPPED | OUTPATIENT
Start: 2022-05-23 | End: 2022-06-22 | Stop reason: SDUPTHER

## 2022-05-23 NOTE — TELEPHONE ENCOUNTER
Medication:   Requested Prescriptions     Pending Prescriptions Disp Refills    amphetamine-dextroamphetamine (ADDERALL, 20MG,) 20 MG tablet 30 tablet 0     Sig: Take 1 tablet by mouth daily for 30 days. Last Filled:      Patient Phone Number: [phone] (home)     Last appt: 3/3/2022   Next appt: Visit date not found    Last OARRS: No flowsheet data found.

## 2022-05-28 DIAGNOSIS — E03.9 HYPOTHYROIDISM, UNSPECIFIED TYPE: ICD-10-CM

## 2022-05-28 DIAGNOSIS — I10 ESSENTIAL HYPERTENSION: ICD-10-CM

## 2022-05-31 RX ORDER — CITALOPRAM 40 MG/1
TABLET ORAL
Qty: 90 TABLET | Refills: 1 | Status: SHIPPED | OUTPATIENT
Start: 2022-05-31 | End: 2022-11-01 | Stop reason: SDUPTHER

## 2022-05-31 RX ORDER — NITROFURANTOIN 25; 75 MG/1; MG/1
100 CAPSULE ORAL 2 TIMES DAILY
Qty: 10 CAPSULE | Refills: 0 | Status: SHIPPED | OUTPATIENT
Start: 2022-05-31 | End: 2022-06-05

## 2022-05-31 RX ORDER — LISINOPRIL AND HYDROCHLOROTHIAZIDE 12.5; 1 MG/1; MG/1
1 TABLET ORAL DAILY
Qty: 90 TABLET | Refills: 1 | Status: SHIPPED | OUTPATIENT
Start: 2022-05-31 | End: 2022-11-01 | Stop reason: SDUPTHER

## 2022-05-31 RX ORDER — LEVOTHYROXINE SODIUM 0.03 MG/1
TABLET ORAL
Qty: 90 TABLET | Refills: 1 | Status: SHIPPED | OUTPATIENT
Start: 2022-05-31 | End: 2022-11-01 | Stop reason: SDUPTHER

## 2022-05-31 NOTE — TELEPHONE ENCOUNTER
Medication:   Requested Prescriptions     Pending Prescriptions Disp Refills    lisinopril-hydroCHLOROthiazide (PRINZIDE;ZESTORETIC) 10-12.5 MG per tablet [Pharmacy Med Name: LISINOPRIL-HCTZ 10/12.5MG TABLETS] 90 tablet 1     Sig: TAKE 1 TABLET BY MOUTH DAILY    citalopram (CELEXA) 40 MG tablet [Pharmacy Med Name: CITALOPRAM 40MG TABLETS] 90 tablet 1     Sig: TAKE 1 TABLET BY MOUTH DAILY    levothyroxine (SYNTHROID) 25 MCG tablet [Pharmacy Med Name: LEVOTHYROXINE 0.025MG (25MCG) TAB] 90 tablet 1     Sig: TAKE 1 TABLET BY MOUTH DAILY        Last Filled:      Patient Phone Number: [phone] (home)     Last appt: 3/3/2022   Next appt: 6/13/2022    Last OARRS: No flowsheet data found.

## 2022-06-07 ENCOUNTER — TELEPHONE (OUTPATIENT)
Dept: ADMINISTRATIVE | Age: 57
End: 2022-06-07

## 2022-06-07 NOTE — TELEPHONE ENCOUNTER
Submitted PA for Amphetamine-Dextroamphetamine 20MG tablets  Via CMM Key: [id number] STATUS: APPROVED effective 06/07/2022 - 06/07/2023    Please notify patient.  Thank you

## 2022-06-08 NOTE — TELEPHONE ENCOUNTER
Left a message regarding medication approval Amphetamine-Dextroamphetamine 20MG tablets  Via CMM Key: [id number] STATUS: APPROVED effective 06/07/2022 - 06/07/2023

## 2022-06-22 ENCOUNTER — TELEPHONE (OUTPATIENT)
Dept: PRIMARY CARE CLINIC | Age: 57
End: 2022-06-22

## 2022-06-22 DIAGNOSIS — F90.9 ATTENTION DEFICIT HYPERACTIVITY DISORDER (ADHD), UNSPECIFIED ADHD TYPE: ICD-10-CM

## 2022-06-22 PROBLEM — I15.2 OBESITY, DIABETES, AND HYPERTENSION SYNDROME (HCC): Status: ACTIVE | Noted: 2022-06-22

## 2022-06-22 PROBLEM — E66.9 OBESITY, DIABETES, AND HYPERTENSION SYNDROME (HCC): Status: ACTIVE | Noted: 2022-06-22

## 2022-06-22 PROBLEM — E78.5 TYPE 2 DIABETES MELLITUS WITH HYPERLIPIDEMIA (HCC): Status: ACTIVE | Noted: 2022-06-22

## 2022-06-22 PROBLEM — E11.59 OBESITY, DIABETES, AND HYPERTENSION SYNDROME (HCC): Status: ACTIVE | Noted: 2022-06-22

## 2022-06-22 PROBLEM — E11.69 TYPE 2 DIABETES MELLITUS WITH HYPERLIPIDEMIA (HCC): Status: ACTIVE | Noted: 2022-06-22

## 2022-06-22 PROBLEM — E11.69 OBESITY, DIABETES, AND HYPERTENSION SYNDROME (HCC): Status: ACTIVE | Noted: 2022-06-22

## 2022-06-22 RX ORDER — DEXTROAMPHETAMINE SACCHARATE, AMPHETAMINE ASPARTATE, DEXTROAMPHETAMINE SULFATE AND AMPHETAMINE SULFATE 5; 5; 5; 5 MG/1; MG/1; MG/1; MG/1
20 TABLET ORAL DAILY
Qty: 30 TABLET | Refills: 0 | Status: SHIPPED | OUTPATIENT
Start: 2022-06-22 | End: 2022-07-25 | Stop reason: SDUPTHER

## 2022-06-22 NOTE — TELEPHONE ENCOUNTER
Medication:   Requested Prescriptions     Pending Prescriptions Disp Refills    amphetamine-dextroamphetamine (ADDERALL, 20MG,) 20 MG tablet 30 tablet 0     Sig: Take 1 tablet by mouth daily for 30 days. Last Filled:      Patient Phone Number: [phone] (home)     Last appt: 3/3/2022   Next appt: 6/22/2022    Last OARRS: No flowsheet data found.

## 2022-07-25 DIAGNOSIS — F90.9 ATTENTION DEFICIT HYPERACTIVITY DISORDER (ADHD), UNSPECIFIED ADHD TYPE: ICD-10-CM

## 2022-07-25 RX ORDER — DEXTROAMPHETAMINE SACCHARATE, AMPHETAMINE ASPARTATE, DEXTROAMPHETAMINE SULFATE AND AMPHETAMINE SULFATE 5; 5; 5; 5 MG/1; MG/1; MG/1; MG/1
20 TABLET ORAL DAILY
Qty: 30 TABLET | Refills: 0 | Status: SHIPPED | OUTPATIENT
Start: 2022-07-25 | End: 2022-08-30 | Stop reason: SDUPTHER

## 2022-07-25 NOTE — TELEPHONE ENCOUNTER
Medication:   Requested Prescriptions     Pending Prescriptions Disp Refills    amphetamine-dextroamphetamine (ADDERALL, 20MG,) 20 MG tablet 30 tablet 0     Sig: Take 1 tablet by mouth in the morning for 30 days. Last Filled:      Patient Phone Number: [phone] (home)     Last appt: 3/3/2022   Next appt: 8/3/2022    Last OARRS: No flowsheet data found.

## 2022-08-08 ENCOUNTER — OFFICE VISIT (OUTPATIENT)
Dept: PRIMARY CARE CLINIC | Age: 57
End: 2022-08-08
Payer: COMMERCIAL

## 2022-08-08 VITALS
HEART RATE: 69 BPM | TEMPERATURE: 96.4 F | OXYGEN SATURATION: 99 % | BODY MASS INDEX: 44.41 KG/M2 | SYSTOLIC BLOOD PRESSURE: 117 MMHG | WEIGHT: 293 LBS | DIASTOLIC BLOOD PRESSURE: 76 MMHG | HEIGHT: 68 IN

## 2022-08-08 DIAGNOSIS — E03.9 HYPOTHYROIDISM, UNSPECIFIED TYPE: ICD-10-CM

## 2022-08-08 DIAGNOSIS — E11.69 TYPE 2 DIABETES MELLITUS WITH HYPERLIPIDEMIA (HCC): ICD-10-CM

## 2022-08-08 DIAGNOSIS — E11.59 OBESITY, DIABETES, AND HYPERTENSION SYNDROME (HCC): Primary | ICD-10-CM

## 2022-08-08 DIAGNOSIS — L98.9 LESION OF SKIN OF FACE: ICD-10-CM

## 2022-08-08 DIAGNOSIS — F90.9 ATTENTION DEFICIT HYPERACTIVITY DISORDER (ADHD), UNSPECIFIED ADHD TYPE: ICD-10-CM

## 2022-08-08 DIAGNOSIS — I15.2 OBESITY, DIABETES, AND HYPERTENSION SYNDROME (HCC): Primary | ICD-10-CM

## 2022-08-08 DIAGNOSIS — E66.9 OBESITY, DIABETES, AND HYPERTENSION SYNDROME (HCC): Primary | ICD-10-CM

## 2022-08-08 DIAGNOSIS — E11.69 OBESITY, DIABETES, AND HYPERTENSION SYNDROME (HCC): Primary | ICD-10-CM

## 2022-08-08 DIAGNOSIS — E78.5 TYPE 2 DIABETES MELLITUS WITH HYPERLIPIDEMIA (HCC): ICD-10-CM

## 2022-08-08 LAB — HBA1C MFR BLD: 6 %

## 2022-08-08 PROCEDURE — 3044F HG A1C LEVEL LT 7.0%: CPT | Performed by: NURSE PRACTITIONER

## 2022-08-08 PROCEDURE — 99214 OFFICE O/P EST MOD 30 MIN: CPT | Performed by: NURSE PRACTITIONER

## 2022-08-08 PROCEDURE — 83036 HEMOGLOBIN GLYCOSYLATED A1C: CPT | Performed by: NURSE PRACTITIONER

## 2022-08-08 ASSESSMENT — PATIENT HEALTH QUESTIONNAIRE - PHQ9
SUM OF ALL RESPONSES TO PHQ9 QUESTIONS 1 & 2: 0
SUM OF ALL RESPONSES TO PHQ QUESTIONS 1-9: 0
2. FEELING DOWN, DEPRESSED OR HOPELESS: 0
SUM OF ALL RESPONSES TO PHQ QUESTIONS 1-9: 0
1. LITTLE INTEREST OR PLEASURE IN DOING THINGS: 0

## 2022-08-08 ASSESSMENT — ENCOUNTER SYMPTOMS: BLURRED VISION: 0

## 2022-08-08 NOTE — PROGRESS NOTES
Claudia Moon (:  1965) is a 55 y.o. female,Established patient, here for evaluation of the following chief complaint(s):  Follow-up and Diabetes         ASSESSMENT/PLAN:  1. Obesity, diabetes, and hypertension syndrome ([address]  -     POCT glycosylated hemoglobin (Hb A1C): 6.0%  -continue Prinzide  -continue Actos  -Continue Tenormin  2. Type 2 diabetes mellitus with hyperlipidemia (HCC)  -Continue current medications. 3. Attention deficit hyperactivity disorder (ADHD), unspecified ADHD type  -continue Adderall  4. Hypothyroidism, unspecified type  -Continue Synthroid  5. Lesion of skin of face- benign mole  -     Jimena Crawley MD, Dermatology, [facility]    Return in about 3 months (around 2022) for HTN, DIABETES. Subjective   SUBJECTIVE/OBJECTIVE:  Hypertension  This is a chronic problem. The current episode started more than 1 year ago. The problem is controlled. Pertinent negatives include no blurred vision, chest pain, headaches, neck pain, palpitations, peripheral edema, shortness of breath or sweats. Risk factors for coronary artery disease include diabetes mellitus, dyslipidemia, family history, obesity and sedentary lifestyle. Past treatments include diuretics, ACE inhibitors and lifestyle changes. The current treatment provides significant improvement. Compliance problems include diet and exercise. Hyperlipidemia  This is a chronic problem. The current episode started more than 1 year ago. Exacerbating diseases include diabetes. Pertinent negatives include no chest pain, myalgias or shortness of breath. Current antihyperlipidemic treatment includes exercise and diet change. Compliance problems include adherence to diet and adherence to exercise. Risk factors for coronary artery disease include diabetes mellitus, hypertension, dyslipidemia and obesity. Diabetes  She presents for her follow-up diabetic visit. She has type 2 diabetes mellitus.  Her disease course has Neurological:  Negative for dizziness, light-headedness and headaches. Psychiatric/Behavioral:  Negative for agitation, decreased concentration and sleep disturbance. The patient is not nervous/anxious. Depression  ADHD  FINESSE        Objective    height is 5' 8\" (1.727 m) and weight is 316 lb (143.3 kg) (abnormal). Her temperature is 96.4 °F (35.8 °C) (abnormal). Her blood pressure is 117/76 and her pulse is 69. Her oxygen saturation is 99%. Patient Active Problem List   Diagnosis    FINESSE (generalized anxiety disorder)    Hypothyroidism    ADHD (attention deficit hyperactivity disorder)    Obesity, diabetes, and hypertension syndrome (HCC)    Type 2 diabetes mellitus with hyperlipidemia (HCC)      Wt Readings from Last 3 Encounters:   08/08/22 (!) 316 lb (143.3 kg)   03/03/22 (!) 314 lb (142.4 kg)   02/25/22 (!) 319 lb (144.7 kg)      Lab Results   Component Value Date    WBC 6.9 02/25/2022    HGB 13.7 02/25/2022    HCT 41.0 02/25/2022    .1 (H) 02/25/2022     02/25/2022      Lab Results   Component Value Date     02/25/2022    K 4.4 02/25/2022    CL 97 (L) 02/25/2022    CO2 23 02/25/2022    BUN 11 02/25/2022    CREATININE 0.6 02/25/2022    GLUCOSE 123 (H) 02/25/2022    CALCIUM 10.0 02/25/2022    PROT 7.8 02/25/2022    LABALBU 4.0 02/25/2022    BILITOT 0.8 02/25/2022    ALKPHOS 133 (H) 02/25/2022    AST 31 02/25/2022    ALT 30 02/25/2022    LABGLOM >60 02/25/2022    GFRAA >60 02/25/2022    AGRATIO 1.1 02/25/2022        Physical Exam  HENT:      Head:            On this date 8/8/2022 I have spent 20 minutes reviewing previous notes, test results and face to face with the patient discussing the diagnosis and importance of compliance with the treatment plan as well as documenting on the day of the visit. An electronic signature was used to authenticate this note.     --NICOLE Kinney - NIK

## 2022-08-09 ASSESSMENT — ENCOUNTER SYMPTOMS
ABDOMINAL PAIN: 0
CONSTIPATION: 0
SHORTNESS OF BREATH: 0
DIARRHEA: 0
CHEST TIGHTNESS: 0

## 2022-08-30 DIAGNOSIS — F90.9 ATTENTION DEFICIT HYPERACTIVITY DISORDER (ADHD), UNSPECIFIED ADHD TYPE: ICD-10-CM

## 2022-08-30 RX ORDER — DEXTROAMPHETAMINE SACCHARATE, AMPHETAMINE ASPARTATE, DEXTROAMPHETAMINE SULFATE AND AMPHETAMINE SULFATE 5; 5; 5; 5 MG/1; MG/1; MG/1; MG/1
20 TABLET ORAL DAILY
Qty: 30 TABLET | Refills: 0 | Status: SHIPPED | OUTPATIENT
Start: 2022-08-30 | End: 2022-09-27 | Stop reason: SDUPTHER

## 2022-08-30 NOTE — TELEPHONE ENCOUNTER
Medication:   Requested Prescriptions     Pending Prescriptions Disp Refills    amphetamine-dextroamphetamine (ADDERALL, 20MG,) 20 MG tablet 30 tablet 0     Sig: Take 1 tablet by mouth daily for 30 days. Last Filled:      Patient Phone Number: [phone] (home) [phone] (work)    Last appt: 8/8/2022   Next appt: 11/8/2022    Last OARRS: No flowsheet data found.

## 2022-09-21 DIAGNOSIS — I10 ESSENTIAL HYPERTENSION: ICD-10-CM

## 2022-09-21 RX ORDER — ATENOLOL 50 MG/1
50 TABLET ORAL DAILY
Qty: 90 TABLET | Refills: 1 | Status: SHIPPED | OUTPATIENT
Start: 2022-09-21 | End: 2022-11-01 | Stop reason: SDUPTHER

## 2022-09-21 NOTE — TELEPHONE ENCOUNTER
Medication:   Requested Prescriptions     Pending Prescriptions Disp Refills    atenolol (TENORMIN) 50 MG tablet [Pharmacy Med Name: ATENOLOL 50MG TABLETS] 90 tablet 1     Sig: TAKE 1 TABLET BY MOUTH DAILY        Last Filled:      Patient Phone Number: [phone] (home) [phone] (work)    Last appt: 8/8/2022   Next appt: 11/8/2022    Last OARRS: No flowsheet data found.

## 2022-09-27 DIAGNOSIS — F90.9 ATTENTION DEFICIT HYPERACTIVITY DISORDER (ADHD), UNSPECIFIED ADHD TYPE: ICD-10-CM

## 2022-09-27 RX ORDER — DEXTROAMPHETAMINE SACCHARATE, AMPHETAMINE ASPARTATE, DEXTROAMPHETAMINE SULFATE AND AMPHETAMINE SULFATE 5; 5; 5; 5 MG/1; MG/1; MG/1; MG/1
20 TABLET ORAL DAILY
Qty: 30 TABLET | Refills: 0 | Status: SHIPPED | OUTPATIENT
Start: 2022-09-27 | End: 2022-11-01 | Stop reason: SDUPTHER

## 2022-11-01 DIAGNOSIS — E03.9 HYPOTHYROIDISM, UNSPECIFIED TYPE: ICD-10-CM

## 2022-11-01 DIAGNOSIS — F90.9 ATTENTION DEFICIT HYPERACTIVITY DISORDER (ADHD), UNSPECIFIED ADHD TYPE: ICD-10-CM

## 2022-11-01 DIAGNOSIS — R06.2 WHEEZING: ICD-10-CM

## 2022-11-01 DIAGNOSIS — I10 ESSENTIAL HYPERTENSION: ICD-10-CM

## 2022-11-02 NOTE — TELEPHONE ENCOUNTER
Medication:   Requested Prescriptions     Pending Prescriptions Disp Refills    albuterol sulfate HFA (PROAIR HFA) 108 (90 Base) MCG/ACT inhaler 1 each 3     Sig: Inhale 2 puffs into the lungs every 6 hours as needed for Wheezing    pioglitazone (ACTOS) 30 MG tablet 30 tablet 3     Sig: Take 1 tablet by mouth daily    lisinopril-hydroCHLOROthiazide (PRINZIDE;ZESTORETIC) 10-12.5 MG per tablet 90 tablet 1     Sig: Take 1 tablet by mouth daily    citalopram (CELEXA) 40 MG tablet 90 tablet 1    levothyroxine (SYNTHROID) 25 MCG tablet 90 tablet 1    atenolol (TENORMIN) 50 MG tablet 90 tablet 1     Sig: Take 1 tablet by mouth daily    amphetamine-dextroamphetamine (ADDERALL, 20MG,) 20 MG tablet 30 tablet 0     Sig: Take 1 tablet by mouth daily for 30 days. Last Filled:      Patient Phone Number: [phone] (home) [phone] (work)    Last appt: 8/8/2022   Next appt: 11/10/2022    Last OARRS: No flowsheet data found.

## 2022-11-03 RX ORDER — DEXTROAMPHETAMINE SACCHARATE, AMPHETAMINE ASPARTATE, DEXTROAMPHETAMINE SULFATE AND AMPHETAMINE SULFATE 5; 5; 5; 5 MG/1; MG/1; MG/1; MG/1
20 TABLET ORAL DAILY
Qty: 30 TABLET | Refills: 0 | Status: SHIPPED | OUTPATIENT
Start: 2022-11-03 | End: 2022-12-03

## 2022-11-03 RX ORDER — CITALOPRAM 40 MG/1
TABLET ORAL
Qty: 90 TABLET | Refills: 1 | Status: SHIPPED | OUTPATIENT
Start: 2022-11-03

## 2022-11-03 RX ORDER — PIOGLITAZONEHYDROCHLORIDE 30 MG/1
30 TABLET ORAL DAILY
Qty: 30 TABLET | Refills: 3 | Status: SHIPPED | OUTPATIENT
Start: 2022-11-03

## 2022-11-03 RX ORDER — LISINOPRIL AND HYDROCHLOROTHIAZIDE 12.5; 1 MG/1; MG/1
1 TABLET ORAL DAILY
Qty: 90 TABLET | Refills: 1 | Status: SHIPPED | OUTPATIENT
Start: 2022-11-03

## 2022-11-03 RX ORDER — ALBUTEROL SULFATE 90 UG/1
2 AEROSOL, METERED RESPIRATORY (INHALATION) EVERY 6 HOURS PRN
Qty: 1 EACH | Refills: 3 | Status: SHIPPED | OUTPATIENT
Start: 2022-11-03

## 2022-11-03 RX ORDER — ATENOLOL 50 MG/1
50 TABLET ORAL DAILY
Qty: 90 TABLET | Refills: 1 | Status: SHIPPED | OUTPATIENT
Start: 2022-11-03 | End: 2022-12-03

## 2022-11-03 RX ORDER — LEVOTHYROXINE SODIUM 0.03 MG/1
TABLET ORAL
Qty: 90 TABLET | Refills: 1 | Status: SHIPPED | OUTPATIENT
Start: 2022-11-03

## 2024-05-16 NOTE — TELEPHONE ENCOUNTER
Medication:   Requested Prescriptions     Pending Prescriptions Disp Refills    amphetamine-dextroamphetamine (ADDERALL, 20MG,) 20 MG tablet 30 tablet 0     Sig: Take 1 tablet by mouth daily for 30 days. Last Filled:      Patient Phone Number: [phone] (home) [phone] (work)    Last appt: 3/29/2021   Next appt: Visit date not found    Last OARRS: No flowsheet data found.
For information on Fall & Injury Prevention, visit: [email or web address] OR  [email or web address] OR  https://www.cdc.gov/steadi/patient.html

## 2024-10-12 NOTE — TELEPHONE ENCOUNTER
Left message for patient on VM requesting return call. Reaching out to see if patient is on the way for her biopsy appointment and reschedule biopsy if necessary.
Patent

## (undated) DEVICE — PROCEDURE KIT ENDOSCP CUST

## (undated) DEVICE — SOLUTION IV IRRIG WATER 500ML POUR BRL ST 2F7113

## (undated) DEVICE — SET VLV 3 PC AWS DISPOSABLE GRDIAN SCOPEVALET

## (undated) DEVICE — TRAP SPEC RETRV CLR PLAS POLYP IN LN SUCT QUIK CTCH

## (undated) DEVICE — BW-412T DISP COMBO CLEANING BRUSH: Brand: SINGLE USE COMBINATION CLEANING BRUSH

## (undated) DEVICE — Device: Brand: DISPOSABLE ELECTROSURGICAL SNARE